# Patient Record
Sex: FEMALE | Race: WHITE | NOT HISPANIC OR LATINO | Employment: STUDENT | URBAN - METROPOLITAN AREA
[De-identification: names, ages, dates, MRNs, and addresses within clinical notes are randomized per-mention and may not be internally consistent; named-entity substitution may affect disease eponyms.]

---

## 2017-02-10 ENCOUNTER — TELEPHONE (OUTPATIENT)
Dept: PEDIATRIC GASTROENTEROLOGY | Facility: CLINIC | Age: 22
End: 2017-02-10

## 2017-02-10 NOTE — TELEPHONE ENCOUNTER
----- Message from Jt Peña sent at 2/10/2017 10:45 AM CST -----  Contact: Pt [phone]  Pt calling to see if the bone density results can be faxed to another Dr office. The office (fax# is [phone]). Please call to advise --------- Pt [phone]

## 2017-02-14 ENCOUNTER — HOSPITAL ENCOUNTER (OUTPATIENT)
Dept: INFUSION THERAPY | Facility: HOSPITAL | Age: 22
Discharge: HOME | End: 2017-02-14
Attending: PEDIATRICS
Payer: COMMERCIAL

## 2017-02-14 VITALS
BODY MASS INDEX: 20.24 KG/M2 | WEIGHT: 110 LBS | HEART RATE: 59 BPM | RESPIRATION RATE: 20 BRPM | SYSTOLIC BLOOD PRESSURE: 116 MMHG | HEIGHT: 62 IN | TEMPERATURE: 99 F | DIASTOLIC BLOOD PRESSURE: 69 MMHG

## 2017-02-14 DIAGNOSIS — R70.0 ELEVATED SEDIMENTATION RATE: ICD-10-CM

## 2017-02-14 DIAGNOSIS — K51.90 UC (ULCERATIVE COLITIS): ICD-10-CM

## 2017-02-14 LAB
ALBUMIN SERPL BCP-MCNC: 3.8 G/DL
ALP SERPL-CCNC: 61 U/L
ALT SERPL W/O P-5'-P-CCNC: 20 U/L
ANION GAP SERPL CALC-SCNC: 7 MMOL/L
AST SERPL-CCNC: 20 U/L
BASOPHILS # BLD AUTO: 0.03 K/UL
BASOPHILS NFR BLD: 0.4 %
BILIRUB SERPL-MCNC: 0.4 MG/DL
BUN SERPL-MCNC: 7 MG/DL
CALCIUM SERPL-MCNC: 9.4 MG/DL
CHLORIDE SERPL-SCNC: 109 MMOL/L
CO2 SERPL-SCNC: 24 MMOL/L
CREAT SERPL-MCNC: 0.8 MG/DL
CRP SERPL-MCNC: 0.58 MG/L
DIFFERENTIAL METHOD: ABNORMAL
EOSINOPHIL # BLD AUTO: 0.2 K/UL
EOSINOPHIL NFR BLD: 2.5 %
ERYTHROCYTE [DISTWIDTH] IN BLOOD BY AUTOMATED COUNT: 13.6 %
ERYTHROCYTE [SEDIMENTATION RATE] IN BLOOD BY WESTERGREN METHOD: 9 MM/HR
EST. GFR  (AFRICAN AMERICAN): >60 ML/MIN/1.73 M^2
EST. GFR  (NON AFRICAN AMERICAN): >60 ML/MIN/1.73 M^2
GGT SERPL-CCNC: 28 U/L
GLUCOSE SERPL-MCNC: 66 MG/DL
HCT VFR BLD AUTO: 38.7 %
HGB BLD-MCNC: 12.7 G/DL
LYMPHOCYTES # BLD AUTO: 2.3 K/UL
LYMPHOCYTES NFR BLD: 33.3 %
MCH RBC QN AUTO: 30.1 PG
MCHC RBC AUTO-ENTMCNC: 32.8 %
MCV RBC AUTO: 92 FL
MONOCYTES # BLD AUTO: 0.6 K/UL
MONOCYTES NFR BLD: 7.9 %
NEUTROPHILS # BLD AUTO: 3.9 K/UL
NEUTROPHILS NFR BLD: 55.8 %
PLATELET # BLD AUTO: 385 K/UL
PMV BLD AUTO: 10.6 FL
POTASSIUM SERPL-SCNC: 3.9 MMOL/L
PROT SERPL-MCNC: 7.9 G/DL
RBC # BLD AUTO: 4.22 M/UL
SODIUM SERPL-SCNC: 140 MMOL/L
WBC # BLD AUTO: 6.93 K/UL

## 2017-02-14 PROCEDURE — 82977 ASSAY OF GGT: CPT

## 2017-02-14 PROCEDURE — 25000003 PHARM REV CODE 250: Mod: PO | Performed by: PEDIATRICS

## 2017-02-14 PROCEDURE — 85651 RBC SED RATE NONAUTOMATED: CPT

## 2017-02-14 PROCEDURE — 63600175 PHARM REV CODE 636 W HCPCS: Mod: PO | Performed by: PEDIATRICS

## 2017-02-14 PROCEDURE — 96415 CHEMO IV INFUSION ADDL HR: CPT | Mod: PO

## 2017-02-14 PROCEDURE — 80053 COMPREHEN METABOLIC PANEL: CPT

## 2017-02-14 PROCEDURE — 86141 C-REACTIVE PROTEIN HS: CPT

## 2017-02-14 PROCEDURE — 85025 COMPLETE CBC W/AUTO DIFF WBC: CPT

## 2017-02-14 PROCEDURE — 96413 CHEMO IV INFUSION 1 HR: CPT | Mod: PO

## 2017-02-14 RX ORDER — SODIUM CHLORIDE 9 MG/ML
INJECTION, SOLUTION INTRAVENOUS ONCE
Status: CANCELLED | OUTPATIENT
Start: 2017-02-14 | End: 2017-02-14

## 2017-02-14 RX ORDER — DIPHENHYDRAMINE HCL 25 MG
25 CAPSULE ORAL ONCE
Status: COMPLETED | OUTPATIENT
Start: 2017-02-14 | End: 2017-02-14

## 2017-02-14 RX ORDER — SODIUM CHLORIDE 9 MG/ML
INJECTION, SOLUTION INTRAVENOUS ONCE
Status: COMPLETED | OUTPATIENT
Start: 2017-02-14 | End: 2017-02-14

## 2017-02-14 RX ORDER — SODIUM CHLORIDE 0.9 % (FLUSH) 0.9 %
5 SYRINGE (ML) INJECTION
Status: CANCELLED | OUTPATIENT
Start: 2017-02-14

## 2017-02-14 RX ORDER — DIPHENHYDRAMINE HCL 25 MG
25 CAPSULE ORAL ONCE
Status: CANCELLED | OUTPATIENT
Start: 2017-02-14 | End: 2017-02-14

## 2017-02-14 RX ORDER — ACETAMINOPHEN 325 MG/1
650 TABLET ORAL
Status: CANCELLED | OUTPATIENT
Start: 2017-02-14 | End: 2017-02-14

## 2017-02-14 RX ORDER — ACETAMINOPHEN 325 MG/1
650 TABLET ORAL
Status: DISCONTINUED | OUTPATIENT
Start: 2017-02-14 | End: 2017-02-15 | Stop reason: HOSPADM

## 2017-02-14 RX ORDER — SODIUM CHLORIDE 0.9 % (FLUSH) 0.9 %
5 SYRINGE (ML) INJECTION
Status: DISCONTINUED | OUTPATIENT
Start: 2017-02-14 | End: 2017-02-15 | Stop reason: HOSPADM

## 2017-02-14 RX ADMIN — SODIUM CHLORIDE, PRESERVATIVE FREE 5 ML: 5 INJECTION INTRAVENOUS at 09:02

## 2017-02-14 RX ADMIN — SODIUM CHLORIDE, PRESERVATIVE FREE 5 ML: 5 INJECTION INTRAVENOUS at 10:02

## 2017-02-14 RX ADMIN — SODIUM CHLORIDE: 9 INJECTION, SOLUTION INTRAVENOUS at 12:02

## 2017-02-14 RX ADMIN — DIPHENHYDRAMINE HYDROCHLORIDE 25 MG: 25 CAPSULE ORAL at 09:02

## 2017-02-14 RX ADMIN — INFLIXIMAB 250 MG: 100 INJECTION, POWDER, LYOPHILIZED, FOR SOLUTION INTRAVENOUS at 10:02

## 2017-02-14 NOTE — PLAN OF CARE
Problem: Patient Care Overview (Adult)  Goal: Plan of Care Review  1. Pt likes IV in right arm.  2. Pt likes to listen to music + read.   Outcome: Ongoing (interventions implemented as appropriate)  Pt states that she is doing well, no problems noted at this time. Pt tolerated remicade today in clinic without any adverse effects.

## 2017-02-14 NOTE — PROGRESS NOTES
1250 - Remicade complete at this time, patient tolerated well. No signs or symptoms of adverse effects noted. VSS, afebrile. PIV d/c'd, catheter tip intact, dry gauze and coban applied over site. Instructed patient to call for any needs or concerns. Patient verbalized complete understanding. Follow up appt given to patient and mother.

## 2017-04-25 ENCOUNTER — OFFICE VISIT (OUTPATIENT)
Dept: PEDIATRIC GASTROENTEROLOGY | Facility: CLINIC | Age: 22
End: 2017-04-25
Payer: COMMERCIAL

## 2017-04-25 ENCOUNTER — HOSPITAL ENCOUNTER (OUTPATIENT)
Dept: INFUSION THERAPY | Facility: HOSPITAL | Age: 22
Discharge: HOME | End: 2017-04-25
Attending: PEDIATRICS
Payer: COMMERCIAL

## 2017-04-25 ENCOUNTER — TELEPHONE (OUTPATIENT)
Dept: PEDIATRIC GASTROENTEROLOGY | Facility: CLINIC | Age: 22
End: 2017-04-25

## 2017-04-25 VITALS
HEART RATE: 66 BPM | SYSTOLIC BLOOD PRESSURE: 105 MMHG | DIASTOLIC BLOOD PRESSURE: 76 MMHG | TEMPERATURE: 98 F | WEIGHT: 108.38 LBS | RESPIRATION RATE: 20 BRPM | HEIGHT: 62 IN | BODY MASS INDEX: 19.94 KG/M2

## 2017-04-25 DIAGNOSIS — K51.00 ULCERATIVE PANCOLITIS WITHOUT COMPLICATION: Primary | ICD-10-CM

## 2017-04-25 DIAGNOSIS — R70.0 ELEVATED SEDIMENTATION RATE: ICD-10-CM

## 2017-04-25 DIAGNOSIS — D84.9 IMMUNOSUPPRESSION: ICD-10-CM

## 2017-04-25 DIAGNOSIS — K51.90 UC (ULCERATIVE COLITIS): ICD-10-CM

## 2017-04-25 DIAGNOSIS — M85.9 LOW BONE DENSITY FOR AGE: ICD-10-CM

## 2017-04-25 LAB
ALBUMIN SERPL BCP-MCNC: 3.6 G/DL
ALP SERPL-CCNC: 67 U/L
ALT SERPL W/O P-5'-P-CCNC: 8 U/L
ANION GAP SERPL CALC-SCNC: 10 MMOL/L
AST SERPL-CCNC: 16 U/L
BASOPHILS # BLD AUTO: 0.05 K/UL
BASOPHILS NFR BLD: 0.6 %
BILIRUB SERPL-MCNC: 0.4 MG/DL
BUN SERPL-MCNC: 6 MG/DL
CALCIUM SERPL-MCNC: 9.9 MG/DL
CHLORIDE SERPL-SCNC: 106 MMOL/L
CO2 SERPL-SCNC: 21 MMOL/L
CREAT SERPL-MCNC: 0.8 MG/DL
CRP SERPL-MCNC: 3.46 MG/L
DIFFERENTIAL METHOD: ABNORMAL
EOSINOPHIL # BLD AUTO: 0.2 K/UL
EOSINOPHIL NFR BLD: 2 %
ERYTHROCYTE [DISTWIDTH] IN BLOOD BY AUTOMATED COUNT: 13.1 %
ERYTHROCYTE [SEDIMENTATION RATE] IN BLOOD BY WESTERGREN METHOD: 25 MM/HR
EST. GFR  (AFRICAN AMERICAN): >60 ML/MIN/1.73 M^2
EST. GFR  (NON AFRICAN AMERICAN): >60 ML/MIN/1.73 M^2
GGT SERPL-CCNC: 23 U/L
GLUCOSE SERPL-MCNC: 74 MG/DL
HCT VFR BLD AUTO: 38.4 %
HGB BLD-MCNC: 13 G/DL
LYMPHOCYTES # BLD AUTO: 2.2 K/UL
LYMPHOCYTES NFR BLD: 25.3 %
MCH RBC QN AUTO: 29 PG
MCHC RBC AUTO-ENTMCNC: 33.9 %
MCV RBC AUTO: 86 FL
MONOCYTES # BLD AUTO: 0.5 K/UL
MONOCYTES NFR BLD: 5.7 %
NEUTROPHILS # BLD AUTO: 5.7 K/UL
NEUTROPHILS NFR BLD: 66.4 %
PLATELET # BLD AUTO: 405 K/UL
PMV BLD AUTO: 10.6 FL
POTASSIUM SERPL-SCNC: 3.7 MMOL/L
PROT SERPL-MCNC: 8.3 G/DL
RBC # BLD AUTO: 4.49 M/UL
SODIUM SERPL-SCNC: 137 MMOL/L
WBC # BLD AUTO: 8.61 K/UL

## 2017-04-25 PROCEDURE — 25000003 PHARM REV CODE 250: Mod: PO | Performed by: PEDIATRICS

## 2017-04-25 PROCEDURE — 82977 ASSAY OF GGT: CPT

## 2017-04-25 PROCEDURE — 85651 RBC SED RATE NONAUTOMATED: CPT

## 2017-04-25 PROCEDURE — 96415 CHEMO IV INFUSION ADDL HR: CPT | Mod: PO

## 2017-04-25 PROCEDURE — 99214 OFFICE O/P EST MOD 30 MIN: CPT | Mod: S$GLB,,, | Performed by: PEDIATRICS

## 2017-04-25 PROCEDURE — 85025 COMPLETE CBC W/AUTO DIFF WBC: CPT

## 2017-04-25 PROCEDURE — 80053 COMPREHEN METABOLIC PANEL: CPT

## 2017-04-25 PROCEDURE — 63600175 PHARM REV CODE 636 W HCPCS: Mod: JW,PO | Performed by: PEDIATRICS

## 2017-04-25 PROCEDURE — 99999 PR PBB SHADOW E&M-EST. PATIENT-LVL II: CPT | Mod: PBBFAC,,, | Performed by: PEDIATRICS

## 2017-04-25 PROCEDURE — 86141 C-REACTIVE PROTEIN HS: CPT

## 2017-04-25 PROCEDURE — 96413 CHEMO IV INFUSION 1 HR: CPT | Mod: PO

## 2017-04-25 RX ORDER — SODIUM CHLORIDE 9 MG/ML
INJECTION, SOLUTION INTRAVENOUS ONCE
Status: COMPLETED | OUTPATIENT
Start: 2017-04-25 | End: 2017-04-25

## 2017-04-25 RX ORDER — DIPHENHYDRAMINE HCL 25 MG
25 CAPSULE ORAL ONCE
Status: CANCELLED | OUTPATIENT
Start: 2017-04-25 | End: 2017-04-25

## 2017-04-25 RX ORDER — DIPHENHYDRAMINE HCL 25 MG
25 CAPSULE ORAL ONCE
Status: COMPLETED | OUTPATIENT
Start: 2017-04-25 | End: 2017-04-25

## 2017-04-25 RX ORDER — SODIUM CHLORIDE 0.9 % (FLUSH) 0.9 %
5 SYRINGE (ML) INJECTION
Status: DISCONTINUED | OUTPATIENT
Start: 2017-04-25 | End: 2017-04-26 | Stop reason: HOSPADM

## 2017-04-25 RX ORDER — ACETAMINOPHEN 325 MG/1
650 TABLET ORAL
Status: CANCELLED | OUTPATIENT
Start: 2017-04-25 | End: 2017-04-25

## 2017-04-25 RX ORDER — SODIUM CHLORIDE 9 MG/ML
INJECTION, SOLUTION INTRAVENOUS ONCE
Status: CANCELLED | OUTPATIENT
Start: 2017-04-25 | End: 2017-04-25

## 2017-04-25 RX ORDER — ACETAMINOPHEN 325 MG/1
650 TABLET ORAL
Status: DISCONTINUED | OUTPATIENT
Start: 2017-04-25 | End: 2017-04-26 | Stop reason: HOSPADM

## 2017-04-25 RX ORDER — SODIUM CHLORIDE 0.9 % (FLUSH) 0.9 %
5 SYRINGE (ML) INJECTION
Status: CANCELLED | OUTPATIENT
Start: 2017-04-25

## 2017-04-25 RX ADMIN — SODIUM CHLORIDE: 9 INJECTION, SOLUTION INTRAVENOUS at 11:04

## 2017-04-25 RX ADMIN — INFLIXIMAB 250 MG: 100 INJECTION, POWDER, LYOPHILIZED, FOR SOLUTION INTRAVENOUS at 09:04

## 2017-04-25 RX ADMIN — DIPHENHYDRAMINE HYDROCHLORIDE 25 MG: 25 CAPSULE ORAL at 09:04

## 2017-04-25 RX ADMIN — SODIUM CHLORIDE, PRESERVATIVE FREE 5 ML: 5 INJECTION INTRAVENOUS at 09:04

## 2017-04-25 NOTE — LETTER
April 25, 2017        Zainab Eagle MD  [address]  [place] [address]             [facility]  [address]  [place] [address]  Phone: [phone]   Patient: Fatoumata Soriano   MR Number: [id number]   YOB: 1995   Date of Visit: 4/25/2017       Dear Dr. Eagle:    Thank you for referring Fatoumata Soriano to me for evaluation. Attached you will find relevant portions of my assessment and plan of care.    If you have questions, please do not hesitate to call me. I look forward to following Fatoumata Soriano along with you.    Sincerely,      Johnnie Hicks MD            CC  No Recipients    Enclosure

## 2017-04-25 NOTE — PROGRESS NOTES
Subjective:       Patient ID: Fatoumata Soriano is a 21 y.o. female.    Chief Complaint: No chief complaint on file.    HPI  Review of Systems   Constitutional: Negative for activity change, appetite change, fatigue, fever and unexpected weight change.   HENT: Negative for congestion, hearing loss, mouth sores, rhinorrhea, sneezing, sore throat and trouble swallowing.    Eyes: Negative for photophobia and visual disturbance.   Respiratory: Negative for apnea, cough, choking, chest tightness, shortness of breath, wheezing and stridor.    Cardiovascular: Negative for chest pain.   Gastrointestinal: Negative for blood in stool, constipation and diarrhea.   Endocrine: Negative for cold intolerance and heat intolerance.   Genitourinary: Negative for decreased urine volume, dysuria and menstrual problem.   Musculoskeletal: Negative for arthralgias, back pain, joint swelling, myalgias, neck pain and neck stiffness.   Skin: Negative for pallor and rash.   Allergic/Immunologic: Negative for food allergies.   Neurological: Negative for seizures, weakness and headaches.   Hematological: Negative for adenopathy. Does not bruise/bleed easily.   Psychiatric/Behavioral: Negative for agitation and sleep disturbance. The patient is not nervous/anxious and is not hyperactive.        Objective:      Physical Exam    Assessment:       1. Ulcerative pancolitis without complication    2. Low bone density for age    3. Immunosuppression        Plan:       CHIEF COMPLAINT: Patient is here for follow up of ulcerative colitis.    HISTORY OF PRESENT ILLNESS: Patient follows up today for ongoing care of above symptoms and for her Remicade infusion.  Patient receives every 10 week infusions as that was established prior to seeing me a few years ago.  Her last Remicade level was 4.9 last year with no antibodies.  Her labs have been normal recently.  Her last DEXA scan showed a Z score of -2.8 in her lower spine in 2015.  Her last colonoscopy was  performed in 2014.  She was diagnosed at age 9.  Currently she reports no pain or diarrhea.  Her bowel movements are once a day and formed.  There is no nighttime awakening.  There've been no fevers.  Her energy level is good.  She has had no trouble with her infusions.  There are no rashes joint complaints or mouth ulcers.  Her menstrual cycles are normal.  Overall she states she is feeling very well.  There is no fatigue.    STUDIES REVIEWED: As above in history of present illness    MEDICATIONS/ALLERGIES: The patient's MedCard has been reviewed and/or reconciled.    PMH, SH, FH, all reviewed and no changes except as noted.    PHYSICAL EXAMINATION:     General: Alert, WN, WH, NAD  Chest: Clear to auscultation bilaterally.No increased work of breathing   Heart: Regular, rate and rhythm without murmur  Abdomen: Soft, non tender, non distended, positive bowel sounds, no hepatosplenomegaly, no stool masses, no rebound or guarding.  Extremities: Symmetric, well perfused and no edema.      IMPRESSION/PLAN: Patient follows up today for ongoing care of above symptoms and for her Remicade infusion.  Clinically she is doing well.  There was maybe some low-level inflammation at previous colonoscopy in 2014.  I discussed with the patient and her mom that we need to repeat colonoscopy soon as it is coming up on 3 years.  She is past 10 years of diagnosis of her disease which starts the increase in her colon cancer risk.  Certainly dysplasia would be a indication for colectomy.  Her weight is down just a little bit but tends to fluctuate a few pounds here there and not a concern.  Her labs were normal last time.  Her Remicade level has been on the lower side that she does not wish to increase her dosage or decreasing her interval.  I will check a level at next infusion.  Reassess.  Certainly lower levels and less frequent dosing increases the risk of antibody formation and loss of response.  Patient needs health maintenance  items as listed below including Prevnar 13 if not already received followed 8 weeks later by Pneumovax.  I will set her up for repeat DEXA scan given her low bone mineral density.  She will follow-up in 10 weeks for her next infusion.  I will schedule her for colonoscopy soon to reassess. I discussed the risk benefits and alternatives of the procedure including sedation by anesthesia and risk of perforating or bruising the organs of the GI tract with the caretaker who verbalized understanding of the plan and risk associated and agreed to proceed. Consent will be obtained at time of endoscopy.    Patient Instructions   Check Level with next infusion  Monitor weight  Yearly eye exams  Yearly flu shots  Prevnar 13 if not already received, followed 8 weeks later with pneumovax  Yearly TB testing  Dexa scan  Repeat Colonoscopy this summer  Follow up 10 weeks      This was discussed at length with parents who expressed understanding and agreement. Questions were answered.  This note has been dictated using voice recognition software.

## 2017-04-25 NOTE — PLAN OF CARE
Problem: Patient Care Overview (Adult)  Goal: Plan of Care Review  1. Pt likes IV in right arm.  2. Pt likes to listen to music + read.   Outcome: Ongoing (interventions implemented as appropriate)  Pt stated that she has been doing well. No problems reported.  Pt tolerated remicade without complications today.

## 2017-04-25 NOTE — PROGRESS NOTES
Remicade infusion complete @ this time.  Pt tolerated infusion without difficulty.  No S+S of adverse reactions noted.  Vital signs stable, afebrile throughout infusion.  IV to right AC d/c'd.  Catheter intact.  Pressure dressing with gauze + coban applied to site.  Pt tolerated procedure well.  Mom instructed to return to clinic in 10 weeks, + to call clinic for any problems or concerns.  Mom repeated back instructions, + verbalized complete understanding.

## 2017-04-25 NOTE — PATIENT INSTRUCTIONS
Check Level with next infusion  Monitor weight  Yearly eye exams  Yearly flu shots  Prevnar 13 if not already received, followed 8 weeks later with pneumovax  Yearly TB testing  Dexa scan  Repeat Colonoscopy this summer  Follow up 10 weeks

## 2017-04-25 NOTE — MR AVS SNAPSHOT
[facility]  [address]  [facility] [address]  Phone: [phone]                  Fatoumata Soriano   2017 8:15 AM   Office Visit    Description:  Female : 1995   Provider:  Johnnie Hicks MD   Department:  [facility]           Diagnoses this Visit        Comments    Ulcerative pancolitis without complication    -  Primary     Low bone density for age         Immunosuppression                To Do List           Future Appointments        Provider Department Dept Phone    2017 9:00 AM PEDS INFUSION CHAIR 1 [facility] [phone]      Goals (5 Years of Data)     None      Follow-Up and Disposition     Return in about 10 weeks (around 2017).      [facility]     [facility] Nurse Care Line -  Assistance  Unless otherwise directed by your provider, please contact [facility], our nurse care line that is available for  assistance.     Registered nurses in the [facility] provide: appointment scheduling, clinical advisement, health education, and other advisory services.  Call: [phone] (toll free)               Medications           Message regarding Medications     Verify the changes and/or additions to your medication regime listed below are the same as discussed with your clinician today.  If any of these changes or additions are incorrect, please notify your healthcare provider.             Verify that the below list of medications is an accurate representation of the medications you are currently taking.  If none reported, the list may be blank. If incorrect, please contact your healthcare provider. Carry this list with you in case of emergency.           Current Medications     calcium carbonate (OS-JOSE RAUL) 600 mg (1,500 mg) Tab Take 1,200 mg by mouth once daily.    ergocalciferol (VITAMIN D2) 50,000 unit Cap Take 800 Units by mouth once daily.    INFLIXIMAB IV Inject into the vein.  Gets every 10 weeks for ulcerative colitis    KARIVA, 28, 0.15-0.02 mgx21 /0.01 mg x 5 per tablet Take 1 tablet by mouth once daily.           Clinical Reference Information           Allergies as of 4/25/2017     No Known Allergies      Immunizations Administered on Date of Encounter - 4/25/2017     None      Orders Placed During Today's Visit      Normal Orders This Visit    Case request GI: COLONOSCOPY     Future Labs/Procedures Expected by Expires    Anser IFX, Subsequent  4/25/2017 6/24/2018    DXA Bone Density Spine And Hip  4/25/2017 4/25/2018      Instructions    Check Level with next infusion  Monitor weight  Yearly eye exams  Yearly flu shots  Prevnar 13 if not already received, followed 8 weeks later with pneumovax  Yearly TB testing  Dexa scan  Repeat Colonoscopy this summer  Follow up 10 weeks       Language Assistance Services     ATTENTION: Language assistance services are available, free of charge. Please call [phone].      ATENCIÓN: Si habla español, tiene a schultz disposición servicios gratuitos de asistencia lingüística. Llame al [phone].     ZEESHAN Ý: N?u b?n nói Ti?ng Vi?t, có các d?ch v? h? tr? ngôn ng? mi?n phí dành cho b?n. G?i s? [phone].         [facility] complies with applicable Federal civil rights laws and does not discriminate on the basis of race, color, national origin, age, disability, or sex.

## 2017-04-25 NOTE — TELEPHONE ENCOUNTER
----- Message from Johnnie Hicks MD sent at 4/25/2017 12:08 PM CDT -----  Needs to be scheduled for Dexa scan and a Colonoscopy

## 2017-04-26 ENCOUNTER — TELEPHONE (OUTPATIENT)
Dept: PEDIATRIC GASTROENTEROLOGY | Facility: CLINIC | Age: 22
End: 2017-04-26

## 2017-05-18 ENCOUNTER — TELEPHONE (OUTPATIENT)
Dept: PEDIATRIC GASTROENTEROLOGY | Facility: CLINIC | Age: 22
End: 2017-05-18

## 2017-05-18 NOTE — TELEPHONE ENCOUNTER
----- Message from Phyllis Hickey sent at 5/18/2017 11:53 AM CDT -----  Contact: [phone] patient  Need a CPT code for Dexascan

## 2017-07-06 ENCOUNTER — HOSPITAL ENCOUNTER (OUTPATIENT)
Dept: INFUSION THERAPY | Facility: HOSPITAL | Age: 22
Discharge: HOME | End: 2017-07-06
Attending: PEDIATRICS
Payer: COMMERCIAL

## 2017-07-06 ENCOUNTER — PATIENT MESSAGE (OUTPATIENT)
Dept: PEDIATRIC GASTROENTEROLOGY | Facility: CLINIC | Age: 22
End: 2017-07-06

## 2017-07-06 ENCOUNTER — OFFICE VISIT (OUTPATIENT)
Dept: PEDIATRIC GASTROENTEROLOGY | Facility: CLINIC | Age: 22
End: 2017-07-06
Payer: COMMERCIAL

## 2017-07-06 VITALS
SYSTOLIC BLOOD PRESSURE: 102 MMHG | RESPIRATION RATE: 20 BRPM | TEMPERATURE: 98 F | HEART RATE: 70 BPM | DIASTOLIC BLOOD PRESSURE: 74 MMHG | WEIGHT: 109.69 LBS | HEIGHT: 62 IN | BODY MASS INDEX: 20.18 KG/M2

## 2017-07-06 VITALS
HEIGHT: 62 IN | DIASTOLIC BLOOD PRESSURE: 73 MMHG | WEIGHT: 109.69 LBS | RESPIRATION RATE: 20 BRPM | SYSTOLIC BLOOD PRESSURE: 112 MMHG | TEMPERATURE: 97 F | HEART RATE: 74 BPM | BODY MASS INDEX: 20.18 KG/M2

## 2017-07-06 DIAGNOSIS — M85.9 LOW BONE DENSITY FOR AGE: ICD-10-CM

## 2017-07-06 DIAGNOSIS — R70.0 ELEVATED SEDIMENTATION RATE: ICD-10-CM

## 2017-07-06 DIAGNOSIS — D84.9 IMMUNOSUPPRESSION: ICD-10-CM

## 2017-07-06 DIAGNOSIS — K51.90 UC (ULCERATIVE COLITIS): ICD-10-CM

## 2017-07-06 DIAGNOSIS — K51.00 ULCERATIVE PANCOLITIS WITHOUT COMPLICATION: ICD-10-CM

## 2017-07-06 LAB
ALBUMIN SERPL BCP-MCNC: 3.8 G/DL
ALP SERPL-CCNC: 86 U/L
ALT SERPL W/O P-5'-P-CCNC: 21 U/L
ANION GAP SERPL CALC-SCNC: 8 MMOL/L
AST SERPL-CCNC: 25 U/L
BASOPHILS # BLD AUTO: 0.03 K/UL
BASOPHILS NFR BLD: 0.4 %
BILIRUB SERPL-MCNC: 0.3 MG/DL
BUN SERPL-MCNC: 13 MG/DL
CALCIUM SERPL-MCNC: 9.4 MG/DL
CHLORIDE SERPL-SCNC: 104 MMOL/L
CO2 SERPL-SCNC: 24 MMOL/L
CREAT SERPL-MCNC: 0.8 MG/DL
CRP SERPL-MCNC: 0.96 MG/L
DIFFERENTIAL METHOD: ABNORMAL
EOSINOPHIL # BLD AUTO: 0.1 K/UL
EOSINOPHIL NFR BLD: 1.7 %
ERYTHROCYTE [DISTWIDTH] IN BLOOD BY AUTOMATED COUNT: 13.6 %
ERYTHROCYTE [SEDIMENTATION RATE] IN BLOOD BY WESTERGREN METHOD: 19 MM/HR
EST. GFR  (AFRICAN AMERICAN): >60 ML/MIN/1.73 M^2
EST. GFR  (NON AFRICAN AMERICAN): >60 ML/MIN/1.73 M^2
GGT SERPL-CCNC: 27 U/L
GLUCOSE SERPL-MCNC: 82 MG/DL
HCT VFR BLD AUTO: 38.3 %
HGB BLD-MCNC: 12.5 G/DL
LYMPHOCYTES # BLD AUTO: 3 K/UL
LYMPHOCYTES NFR BLD: 42.7 %
MCH RBC QN AUTO: 28.7 PG
MCHC RBC AUTO-ENTMCNC: 32.6 %
MCV RBC AUTO: 88 FL
MONOCYTES # BLD AUTO: 0.5 K/UL
MONOCYTES NFR BLD: 7.4 %
NEUTROPHILS # BLD AUTO: 3.4 K/UL
NEUTROPHILS NFR BLD: 47.7 %
PLATELET # BLD AUTO: 400 K/UL
PMV BLD AUTO: 10.3 FL
POTASSIUM SERPL-SCNC: 4.1 MMOL/L
PROT SERPL-MCNC: 7.9 G/DL
RBC # BLD AUTO: 4.35 M/UL
SODIUM SERPL-SCNC: 136 MMOL/L
WBC # BLD AUTO: 7.03 K/UL

## 2017-07-06 PROCEDURE — 63600175 PHARM REV CODE 636 W HCPCS: Mod: PO | Performed by: PEDIATRICS

## 2017-07-06 PROCEDURE — 96413 CHEMO IV INFUSION 1 HR: CPT | Mod: PO

## 2017-07-06 PROCEDURE — 86141 C-REACTIVE PROTEIN HS: CPT

## 2017-07-06 PROCEDURE — 82977 ASSAY OF GGT: CPT

## 2017-07-06 PROCEDURE — 84999 UNLISTED CHEMISTRY PROCEDURE: CPT

## 2017-07-06 PROCEDURE — 96415 CHEMO IV INFUSION ADDL HR: CPT | Mod: PO

## 2017-07-06 PROCEDURE — 80053 COMPREHEN METABOLIC PANEL: CPT

## 2017-07-06 PROCEDURE — 85651 RBC SED RATE NONAUTOMATED: CPT

## 2017-07-06 PROCEDURE — 25000003 PHARM REV CODE 250: Mod: PO | Performed by: PEDIATRICS

## 2017-07-06 PROCEDURE — 99999 PR PBB SHADOW E&M-EST. PATIENT-LVL V: CPT | Mod: PBBFAC,,, | Performed by: PEDIATRICS

## 2017-07-06 PROCEDURE — 99214 OFFICE O/P EST MOD 30 MIN: CPT | Mod: S$GLB,,, | Performed by: PEDIATRICS

## 2017-07-06 PROCEDURE — 85025 COMPLETE CBC W/AUTO DIFF WBC: CPT

## 2017-07-06 RX ORDER — DIPHENHYDRAMINE HCL 25 MG
25 CAPSULE ORAL ONCE
Status: CANCELLED | OUTPATIENT
Start: 2017-07-06 | End: 2017-07-06

## 2017-07-06 RX ORDER — SODIUM CHLORIDE 9 MG/ML
INJECTION, SOLUTION INTRAVENOUS ONCE
Status: CANCELLED | OUTPATIENT
Start: 2017-07-06 | End: 2017-07-06

## 2017-07-06 RX ORDER — ACETAMINOPHEN 325 MG/1
650 TABLET ORAL
Status: CANCELLED | OUTPATIENT
Start: 2017-07-06 | End: 2017-07-06

## 2017-07-06 RX ORDER — VENLAFAXINE HYDROCHLORIDE 75 MG/1
CAPSULE, EXTENDED RELEASE ORAL
Refills: 1 | COMMUNITY
Start: 2017-06-07 | End: 2019-03-29 | Stop reason: DRUGHIGH

## 2017-07-06 RX ORDER — ACETAMINOPHEN 325 MG/1
650 TABLET ORAL
Status: DISCONTINUED | OUTPATIENT
Start: 2017-07-06 | End: 2017-07-07 | Stop reason: HOSPADM

## 2017-07-06 RX ORDER — DIPHENHYDRAMINE HCL 25 MG
25 CAPSULE ORAL ONCE
Status: COMPLETED | OUTPATIENT
Start: 2017-07-06 | End: 2017-07-06

## 2017-07-06 RX ORDER — SODIUM CHLORIDE 0.9 % (FLUSH) 0.9 %
5 SYRINGE (ML) INJECTION
Status: DISCONTINUED | OUTPATIENT
Start: 2017-07-06 | End: 2017-07-07 | Stop reason: HOSPADM

## 2017-07-06 RX ORDER — SODIUM CHLORIDE 9 MG/ML
INJECTION, SOLUTION INTRAVENOUS ONCE
Status: COMPLETED | OUTPATIENT
Start: 2017-07-06 | End: 2017-07-06

## 2017-07-06 RX ORDER — SODIUM CHLORIDE 0.9 % (FLUSH) 0.9 %
5 SYRINGE (ML) INJECTION
Status: CANCELLED | OUTPATIENT
Start: 2017-07-06

## 2017-07-06 RX ADMIN — DIPHENHYDRAMINE HYDROCHLORIDE 25 MG: 25 CAPSULE ORAL at 09:07

## 2017-07-06 RX ADMIN — INFLIXIMAB 250 MG: 100 INJECTION, POWDER, LYOPHILIZED, FOR SOLUTION INTRAVENOUS at 09:07

## 2017-07-06 RX ADMIN — SODIUM CHLORIDE: 9 INJECTION, SOLUTION INTRAVENOUS at 11:07

## 2017-07-06 RX ADMIN — SODIUM CHLORIDE, PRESERVATIVE FREE 5 ML: 5 INJECTION INTRAVENOUS at 09:07

## 2017-07-06 NOTE — PLAN OF CARE
Problem: Patient Care Overview (Adult)  Goal: Plan of Care Review  1.  Pt likes IV in right arm.  2.  Pt likes to listen to music + read.   Outcome: Ongoing (interventions implemented as appropriate)  Pt stated that she has been doing well.  She has been having an uneventful summer, + keeping busy with work.  No GI problems reported today.  Pt tolerated remicade infusion without difficulty today.

## 2017-07-06 NOTE — LETTER
July 6, 2017        Zainab Eagle MD  [address]  [place] [address]             [facility]  [address]  [place] [address]  Phone: [phone]   Patient: Fatoumata Soriano   MR Number: [id number]   YOB: 1995   Date of Visit: 7/6/2017       Dear Dr. Eagle:    Thank you for referring Fatoumata Soriano to me for evaluation. Attached you will find relevant portions of my assessment and plan of care.    If you have questions, please do not hesitate to call me. I look forward to following Fatoumata Soriano along with you.    Sincerely,      Johnnie Hicks MD            CC  No Recipients    Enclosure

## 2017-07-06 NOTE — PROGRESS NOTES
Remicade infusion complete @ this time.  Pt tolerated infusion without difficulty.  No S+S of adverse reactions noted.  Vital signs stable, afebrile throughout infusion.  IV to left AC d/c'd.  Catheter intact.  Pressure dressing with gauze + coban applied to site.  Pt tolerated procedure well.  Pt instructed to return to clinic in 10 weeks, + to call clinic for any problems or concerns.  Pt repeated back instructions, + verbalized complete understanding.

## 2017-07-07 ENCOUNTER — TELEPHONE (OUTPATIENT)
Dept: PEDIATRIC GASTROENTEROLOGY | Facility: CLINIC | Age: 22
End: 2017-07-07

## 2017-07-07 ENCOUNTER — PATIENT MESSAGE (OUTPATIENT)
Dept: PEDIATRIC GASTROENTEROLOGY | Facility: CLINIC | Age: 22
End: 2017-07-07

## 2017-07-07 NOTE — PATIENT INSTRUCTIONS
Remicade level today  Recommend Pneumovax  Yearly Flu shots  Yearly Eye exams  Yearly TB skin testing  Repeat Dexa scan  Colonoscopy to be scheduled  Follow up 10 weeks

## 2017-07-07 NOTE — TELEPHONE ENCOUNTER
----- Message from Johnnie Hicks MD sent at 7/6/2017 10:39 PM CDT -----  Needs to be scheduled for colonoscopy and a dexa scan. BM

## 2017-07-07 NOTE — TELEPHONE ENCOUNTER
Sent mom a message through [employer] to see when and at which location she would like for me to sched a dexa scan.

## 2017-08-17 LAB — ANSER IFX,SUBSEQUENT: NORMAL

## 2017-09-14 ENCOUNTER — HOSPITAL ENCOUNTER (OUTPATIENT)
Dept: INFUSION THERAPY | Facility: HOSPITAL | Age: 22
Discharge: HOME | End: 2017-09-14
Attending: PEDIATRICS
Payer: COMMERCIAL

## 2017-09-14 VITALS
SYSTOLIC BLOOD PRESSURE: 104 MMHG | BODY MASS INDEX: 20.24 KG/M2 | RESPIRATION RATE: 20 BRPM | DIASTOLIC BLOOD PRESSURE: 97 MMHG | HEART RATE: 80 BPM | TEMPERATURE: 98 F | WEIGHT: 110 LBS | HEIGHT: 62 IN

## 2017-09-14 DIAGNOSIS — K51.00 ULCERATIVE PANCOLITIS WITHOUT COMPLICATION: ICD-10-CM

## 2017-09-14 DIAGNOSIS — R70.0 ELEVATED SEDIMENTATION RATE: ICD-10-CM

## 2017-09-14 LAB
BASOPHILS # BLD AUTO: 0.04 K/UL
BASOPHILS NFR BLD: 0.5 %
DIFFERENTIAL METHOD: NORMAL
EOSINOPHIL # BLD AUTO: 0.2 K/UL
EOSINOPHIL NFR BLD: 2 %
ERYTHROCYTE [DISTWIDTH] IN BLOOD BY AUTOMATED COUNT: 13.8 %
ERYTHROCYTE [SEDIMENTATION RATE] IN BLOOD BY WESTERGREN METHOD: 10 MM/HR
HCT VFR BLD AUTO: 39.2 %
HGB BLD-MCNC: 13 G/DL
LYMPHOCYTES # BLD AUTO: 2.4 K/UL
LYMPHOCYTES NFR BLD: 33 %
MCH RBC QN AUTO: 29 PG
MCHC RBC AUTO-ENTMCNC: 33.2 G/DL
MCV RBC AUTO: 87 FL
MONOCYTES # BLD AUTO: 0.7 K/UL
MONOCYTES NFR BLD: 9.1 %
NEUTROPHILS # BLD AUTO: 4.1 K/UL
NEUTROPHILS NFR BLD: 55.3 %
PLATELET # BLD AUTO: 334 K/UL
PMV BLD AUTO: 11.5 FL
RBC # BLD AUTO: 4.49 M/UL
WBC # BLD AUTO: 7.4 K/UL

## 2017-09-14 PROCEDURE — 85025 COMPLETE CBC W/AUTO DIFF WBC: CPT

## 2017-09-14 PROCEDURE — 96413 CHEMO IV INFUSION 1 HR: CPT | Mod: PO

## 2017-09-14 PROCEDURE — 63600175 PHARM REV CODE 636 W HCPCS: Mod: PO | Performed by: PEDIATRICS

## 2017-09-14 PROCEDURE — 85651 RBC SED RATE NONAUTOMATED: CPT

## 2017-09-14 PROCEDURE — A4216 STERILE WATER/SALINE, 10 ML: HCPCS | Mod: PO | Performed by: PEDIATRICS

## 2017-09-14 PROCEDURE — 96415 CHEMO IV INFUSION ADDL HR: CPT | Mod: PO

## 2017-09-14 PROCEDURE — 25000003 PHARM REV CODE 250: Mod: PO | Performed by: PEDIATRICS

## 2017-09-14 RX ORDER — SODIUM CHLORIDE 9 MG/ML
INJECTION, SOLUTION INTRAVENOUS ONCE
Status: CANCELLED | OUTPATIENT
Start: 2017-09-14 | End: 2017-09-14

## 2017-09-14 RX ORDER — ACETAMINOPHEN 325 MG/1
650 TABLET ORAL
Status: DISCONTINUED | OUTPATIENT
Start: 2017-09-14 | End: 2017-09-15 | Stop reason: HOSPADM

## 2017-09-14 RX ORDER — SODIUM CHLORIDE 9 MG/ML
INJECTION, SOLUTION INTRAVENOUS ONCE
Status: COMPLETED | OUTPATIENT
Start: 2017-09-14 | End: 2017-09-14

## 2017-09-14 RX ORDER — DIPHENHYDRAMINE HCL 25 MG
25 CAPSULE ORAL ONCE
Status: COMPLETED | OUTPATIENT
Start: 2017-09-14 | End: 2017-09-14

## 2017-09-14 RX ORDER — DIPHENHYDRAMINE HCL 25 MG
25 CAPSULE ORAL ONCE
Status: CANCELLED | OUTPATIENT
Start: 2017-09-14 | End: 2017-09-14

## 2017-09-14 RX ORDER — SODIUM CHLORIDE 0.9 % (FLUSH) 0.9 %
5 SYRINGE (ML) INJECTION
Status: DISCONTINUED | OUTPATIENT
Start: 2017-09-14 | End: 2017-09-15 | Stop reason: HOSPADM

## 2017-09-14 RX ORDER — ACETAMINOPHEN 325 MG/1
650 TABLET ORAL
Status: CANCELLED | OUTPATIENT
Start: 2017-09-14 | End: 2017-09-14

## 2017-09-14 RX ORDER — SODIUM CHLORIDE 0.9 % (FLUSH) 0.9 %
5 SYRINGE (ML) INJECTION
Status: CANCELLED | OUTPATIENT
Start: 2017-09-14

## 2017-09-14 RX ADMIN — SODIUM CHLORIDE: 9 INJECTION, SOLUTION INTRAVENOUS at 11:09

## 2017-09-14 RX ADMIN — SODIUM CHLORIDE, PRESERVATIVE FREE 5 ML: 5 INJECTION INTRAVENOUS at 09:09

## 2017-09-14 RX ADMIN — DIPHENHYDRAMINE HYDROCHLORIDE 25 MG: 25 CAPSULE ORAL at 09:09

## 2017-09-14 RX ADMIN — INFLIXIMAB 250 MG: 100 INJECTION, POWDER, LYOPHILIZED, FOR SOLUTION INTRAVENOUS at 09:09

## 2017-09-14 NOTE — PLAN OF CARE
Problem: Patient Care Overview (Adult)  Goal: Plan of Care Review  1.  Pt likes IV in right arm.  2.  Pt likes to listen to music + read.   Outcome: Ongoing (interventions implemented as appropriate)  Pt stated that she has been doing well.  No problems reported today,  She has been working a lot, + tolerated remicade infusion without complications today.

## 2017-09-14 NOTE — PROGRESS NOTES
Remicade infusion complete @ this time.  Pt tolerated infusion without difficulty.  No S+S of adverse reactions noted.  Vital signs stable, afebrile throughout infusion.  IV to left forearm d/c'd.  Catheter intact.  Pressure dressing with gauze + coban applied to site.  Pt tolerated procedure well.  Mom instructed to return to clinic in 10 weeks, + to call clinic with any problems or concerns.  Mom repeated back instructions, + verbalized complete understanding.

## 2017-09-15 ENCOUNTER — TELEPHONE (OUTPATIENT)
Dept: PEDIATRIC GASTROENTEROLOGY | Facility: CLINIC | Age: 22
End: 2017-09-15

## 2017-09-15 NOTE — TELEPHONE ENCOUNTER
----- Message from Mehdi JAY Kareen sent at 9/15/2017  9:37 AM CDT -----  Contact: Lab Client Services  Hi my name is Mehdi I work in the lab Client Services. We had a problem with some lab work on this patient. If someone from your office could call us at [phone] or [phone] that would be great. Anyone in my department can help. Thank You

## 2017-11-22 ENCOUNTER — HOSPITAL ENCOUNTER (OUTPATIENT)
Dept: INFUSION THERAPY | Facility: HOSPITAL | Age: 22
Discharge: HOME | End: 2017-11-22
Attending: PEDIATRICS
Payer: COMMERCIAL

## 2017-11-22 VITALS
TEMPERATURE: 98 F | BODY MASS INDEX: 20.67 KG/M2 | HEART RATE: 57 BPM | DIASTOLIC BLOOD PRESSURE: 76 MMHG | RESPIRATION RATE: 20 BRPM | HEIGHT: 62 IN | SYSTOLIC BLOOD PRESSURE: 135 MMHG | WEIGHT: 112.31 LBS

## 2017-11-22 DIAGNOSIS — K51.00 ULCERATIVE PANCOLITIS WITHOUT COMPLICATION: ICD-10-CM

## 2017-11-22 DIAGNOSIS — R70.0 ELEVATED SEDIMENTATION RATE: ICD-10-CM

## 2017-11-22 LAB
ALBUMIN SERPL BCP-MCNC: 3.7 G/DL
ALP SERPL-CCNC: 91 U/L
ALT SERPL W/O P-5'-P-CCNC: 10 U/L
ANION GAP SERPL CALC-SCNC: 7 MMOL/L
AST SERPL-CCNC: 23 U/L
BASOPHILS # BLD AUTO: 0.06 K/UL
BASOPHILS NFR BLD: 0.9 %
BILIRUB SERPL-MCNC: 0.2 MG/DL
BUN SERPL-MCNC: 10 MG/DL
CALCIUM SERPL-MCNC: 9.4 MG/DL
CHLORIDE SERPL-SCNC: 105 MMOL/L
CO2 SERPL-SCNC: 27 MMOL/L
CREAT SERPL-MCNC: 0.7 MG/DL
CRP SERPL-MCNC: 0.92 MG/L
DIFFERENTIAL METHOD: ABNORMAL
EOSINOPHIL # BLD AUTO: 0.3 K/UL
EOSINOPHIL NFR BLD: 3.9 %
ERYTHROCYTE [DISTWIDTH] IN BLOOD BY AUTOMATED COUNT: 13.6 %
ERYTHROCYTE [SEDIMENTATION RATE] IN BLOOD BY WESTERGREN METHOD: 22 MM/HR
EST. GFR  (AFRICAN AMERICAN): >60 ML/MIN/1.73 M^2
EST. GFR  (NON AFRICAN AMERICAN): >60 ML/MIN/1.73 M^2
GGT SERPL-CCNC: 22 U/L
GLUCOSE SERPL-MCNC: 86 MG/DL
HCT VFR BLD AUTO: 35.8 %
HGB BLD-MCNC: 11.7 G/DL
IMM GRANULOCYTES # BLD AUTO: 0.01 K/UL
IMM GRANULOCYTES NFR BLD AUTO: 0.2 %
LYMPHOCYTES # BLD AUTO: 2.4 K/UL
LYMPHOCYTES NFR BLD: 37.6 %
MCH RBC QN AUTO: 28.7 PG
MCHC RBC AUTO-ENTMCNC: 32.7 G/DL
MCV RBC AUTO: 88 FL
MONOCYTES # BLD AUTO: 0.6 K/UL
MONOCYTES NFR BLD: 8.7 %
NEUTROPHILS # BLD AUTO: 3.1 K/UL
NEUTROPHILS NFR BLD: 48.7 %
NRBC BLD-RTO: 0 /100 WBC
PLATELET # BLD AUTO: 379 K/UL
PMV BLD AUTO: 10.2 FL
POTASSIUM SERPL-SCNC: 4.5 MMOL/L
PROT SERPL-MCNC: 8.1 G/DL
RBC # BLD AUTO: 4.07 M/UL
SODIUM SERPL-SCNC: 139 MMOL/L
WBC # BLD AUTO: 6.41 K/UL

## 2017-11-22 PROCEDURE — 80053 COMPREHEN METABOLIC PANEL: CPT

## 2017-11-22 PROCEDURE — 86141 C-REACTIVE PROTEIN HS: CPT

## 2017-11-22 PROCEDURE — 96413 CHEMO IV INFUSION 1 HR: CPT | Mod: PO

## 2017-11-22 PROCEDURE — 85025 COMPLETE CBC W/AUTO DIFF WBC: CPT

## 2017-11-22 PROCEDURE — 96415 CHEMO IV INFUSION ADDL HR: CPT | Mod: PO

## 2017-11-22 PROCEDURE — A4216 STERILE WATER/SALINE, 10 ML: HCPCS | Mod: PO | Performed by: PEDIATRICS

## 2017-11-22 PROCEDURE — 63600175 PHARM REV CODE 636 W HCPCS: Mod: PO | Performed by: PEDIATRICS

## 2017-11-22 PROCEDURE — 85651 RBC SED RATE NONAUTOMATED: CPT

## 2017-11-22 PROCEDURE — 82977 ASSAY OF GGT: CPT

## 2017-11-22 PROCEDURE — 25000003 PHARM REV CODE 250: Mod: PO | Performed by: PEDIATRICS

## 2017-11-22 RX ORDER — ACETAMINOPHEN 325 MG/1
650 TABLET ORAL
Status: CANCELLED | OUTPATIENT
Start: 2017-11-22 | End: 2017-11-22

## 2017-11-22 RX ORDER — SODIUM CHLORIDE 9 MG/ML
INJECTION, SOLUTION INTRAVENOUS ONCE
Status: CANCELLED | OUTPATIENT
Start: 2017-11-22 | End: 2017-11-22

## 2017-11-22 RX ORDER — SODIUM CHLORIDE 0.9 % (FLUSH) 0.9 %
5 SYRINGE (ML) INJECTION
Status: DISCONTINUED | OUTPATIENT
Start: 2017-11-22 | End: 2017-11-23 | Stop reason: HOSPADM

## 2017-11-22 RX ORDER — DIPHENHYDRAMINE HCL 25 MG
25 CAPSULE ORAL ONCE
Status: COMPLETED | OUTPATIENT
Start: 2017-11-22 | End: 2017-11-22

## 2017-11-22 RX ORDER — SODIUM CHLORIDE 9 MG/ML
INJECTION, SOLUTION INTRAVENOUS ONCE
Status: COMPLETED | OUTPATIENT
Start: 2017-11-22 | End: 2017-11-22

## 2017-11-22 RX ORDER — SODIUM CHLORIDE 0.9 % (FLUSH) 0.9 %
5 SYRINGE (ML) INJECTION
Status: CANCELLED | OUTPATIENT
Start: 2017-11-22

## 2017-11-22 RX ORDER — DIPHENHYDRAMINE HCL 25 MG
25 CAPSULE ORAL ONCE
Status: CANCELLED | OUTPATIENT
Start: 2017-11-22 | End: 2017-11-22

## 2017-11-22 RX ORDER — PROPRANOLOL HYDROCHLORIDE 10 MG/1
30 TABLET ORAL
Refills: 0 | COMMUNITY
Start: 2017-09-08 | End: 2019-03-29 | Stop reason: DRUGHIGH

## 2017-11-22 RX ADMIN — DIPHENHYDRAMINE HYDROCHLORIDE 25 MG: 25 CAPSULE ORAL at 09:11

## 2017-11-22 RX ADMIN — INFLIXIMAB 250 MG: 100 INJECTION, POWDER, LYOPHILIZED, FOR SOLUTION INTRAVENOUS at 09:11

## 2017-11-22 RX ADMIN — SODIUM CHLORIDE, PRESERVATIVE FREE 5 ML: 5 INJECTION INTRAVENOUS at 09:11

## 2017-11-22 RX ADMIN — SODIUM CHLORIDE: 9 INJECTION, SOLUTION INTRAVENOUS at 11:11

## 2017-11-22 NOTE — NURSING
Remicade infusion complete. Patient tolerated infusion well. Patient with no S+S of adverse reactions. Patient VSS and afebrile. Patient with  Right AC PIV d/c'd. Gauze and coban applied to site. Site is clean, dry and intact. Next appointment scheduled in 10 weeks. Patient informed to call with questions and concern.

## 2017-11-22 NOTE — PLAN OF CARE
Problem: Patient Care Overview (Adult)  Goal: Plan of Care Review  1.  Pt likes IV in right arm.  2.  Pt likes to listen to music + read.   Outcome: Ongoing (interventions implemented as appropriate)  Patient reports no issues at this time. Patient tolerated infusion well. Patient talked with family throughout the infusion. Updated on plan of care.

## 2018-01-31 ENCOUNTER — OFFICE VISIT (OUTPATIENT)
Dept: PEDIATRIC GASTROENTEROLOGY | Facility: CLINIC | Age: 23
End: 2018-01-31
Payer: COMMERCIAL

## 2018-01-31 ENCOUNTER — HOSPITAL ENCOUNTER (OUTPATIENT)
Dept: INFUSION THERAPY | Facility: HOSPITAL | Age: 23
Discharge: HOME | End: 2018-01-31
Attending: PEDIATRICS
Payer: COMMERCIAL

## 2018-01-31 VITALS
DIASTOLIC BLOOD PRESSURE: 59 MMHG | WEIGHT: 114.63 LBS | SYSTOLIC BLOOD PRESSURE: 102 MMHG | RESPIRATION RATE: 20 BRPM | TEMPERATURE: 98 F | HEART RATE: 76 BPM | BODY MASS INDEX: 21.23 KG/M2

## 2018-01-31 DIAGNOSIS — K51.00 ULCERATIVE PANCOLITIS WITHOUT COMPLICATION: ICD-10-CM

## 2018-01-31 DIAGNOSIS — R70.0 ELEVATED SEDIMENTATION RATE: ICD-10-CM

## 2018-01-31 DIAGNOSIS — K51.00 ULCERATIVE PANCOLITIS WITHOUT COMPLICATION: Primary | ICD-10-CM

## 2018-01-31 DIAGNOSIS — D84.9 IMMUNOSUPPRESSION: ICD-10-CM

## 2018-01-31 LAB
ALBUMIN SERPL BCP-MCNC: 3.8 G/DL
ALP SERPL-CCNC: 89 U/L
ALT SERPL W/O P-5'-P-CCNC: 11 U/L
ANION GAP SERPL CALC-SCNC: 10 MMOL/L
AST SERPL-CCNC: 32 U/L
BASOPHILS # BLD AUTO: 0.06 K/UL
BASOPHILS NFR BLD: 0.7 %
BILIRUB SERPL-MCNC: 0.5 MG/DL
BUN SERPL-MCNC: 7 MG/DL
CALCIUM SERPL-MCNC: 9.3 MG/DL
CHLORIDE SERPL-SCNC: 103 MMOL/L
CO2 SERPL-SCNC: 26 MMOL/L
CREAT SERPL-MCNC: 0.7 MG/DL
CRP SERPL-MCNC: 3.31 MG/L
DIFFERENTIAL METHOD: ABNORMAL
EOSINOPHIL # BLD AUTO: 0.2 K/UL
EOSINOPHIL NFR BLD: 2.2 %
ERYTHROCYTE [DISTWIDTH] IN BLOOD BY AUTOMATED COUNT: 14.2 %
ERYTHROCYTE [SEDIMENTATION RATE] IN BLOOD BY WESTERGREN METHOD: 23 MM/HR
EST. GFR  (AFRICAN AMERICAN): >60 ML/MIN/1.73 M^2
EST. GFR  (NON AFRICAN AMERICAN): >60 ML/MIN/1.73 M^2
GGT SERPL-CCNC: 22 U/L
GLUCOSE SERPL-MCNC: 81 MG/DL
HCT VFR BLD AUTO: 38.1 %
HGB BLD-MCNC: 12 G/DL
IMM GRANULOCYTES # BLD AUTO: 0.04 K/UL
IMM GRANULOCYTES NFR BLD AUTO: 0.5 %
LYMPHOCYTES # BLD AUTO: 2 K/UL
LYMPHOCYTES NFR BLD: 24.3 %
MCH RBC QN AUTO: 28.2 PG
MCHC RBC AUTO-ENTMCNC: 31.5 G/DL
MCV RBC AUTO: 89 FL
MONOCYTES # BLD AUTO: 0.8 K/UL
MONOCYTES NFR BLD: 9.6 %
NEUTROPHILS # BLD AUTO: 5.2 K/UL
NEUTROPHILS NFR BLD: 62.7 %
NRBC BLD-RTO: 0 /100 WBC
PLATELET # BLD AUTO: 324 K/UL
PMV BLD AUTO: 10.5 FL
POTASSIUM SERPL-SCNC: 3.8 MMOL/L
PROT SERPL-MCNC: 8.2 G/DL
RBC # BLD AUTO: 4.26 M/UL
SODIUM SERPL-SCNC: 139 MMOL/L
WBC # BLD AUTO: 8.27 K/UL

## 2018-01-31 PROCEDURE — 80053 COMPREHEN METABOLIC PANEL: CPT

## 2018-01-31 PROCEDURE — 3008F BODY MASS INDEX DOCD: CPT | Mod: S$GLB,,, | Performed by: PEDIATRICS

## 2018-01-31 PROCEDURE — 99999 PR PBB SHADOW E&M-EST. PATIENT-LVL II: CPT | Mod: PBBFAC,,, | Performed by: PEDIATRICS

## 2018-01-31 PROCEDURE — 82977 ASSAY OF GGT: CPT

## 2018-01-31 PROCEDURE — A4216 STERILE WATER/SALINE, 10 ML: HCPCS | Mod: PO | Performed by: PEDIATRICS

## 2018-01-31 PROCEDURE — 85651 RBC SED RATE NONAUTOMATED: CPT

## 2018-01-31 PROCEDURE — 99214 OFFICE O/P EST MOD 30 MIN: CPT | Mod: S$GLB,,, | Performed by: PEDIATRICS

## 2018-01-31 PROCEDURE — 25000003 PHARM REV CODE 250: Mod: PO | Performed by: PEDIATRICS

## 2018-01-31 PROCEDURE — 96415 CHEMO IV INFUSION ADDL HR: CPT | Mod: PO

## 2018-01-31 PROCEDURE — 85025 COMPLETE CBC W/AUTO DIFF WBC: CPT

## 2018-01-31 PROCEDURE — 63600175 PHARM REV CODE 636 W HCPCS: Mod: JG,PO | Performed by: PEDIATRICS

## 2018-01-31 PROCEDURE — 96413 CHEMO IV INFUSION 1 HR: CPT | Mod: PO

## 2018-01-31 PROCEDURE — 86141 C-REACTIVE PROTEIN HS: CPT

## 2018-01-31 RX ORDER — SODIUM CHLORIDE 9 MG/ML
INJECTION, SOLUTION INTRAVENOUS ONCE
Status: CANCELLED | OUTPATIENT
Start: 2018-01-31 | End: 2018-01-31

## 2018-01-31 RX ORDER — SODIUM CHLORIDE 0.9 % (FLUSH) 0.9 %
5 SYRINGE (ML) INJECTION
Status: CANCELLED | OUTPATIENT
Start: 2018-01-31

## 2018-01-31 RX ORDER — DIPHENHYDRAMINE HCL 25 MG
25 CAPSULE ORAL ONCE
Status: COMPLETED | OUTPATIENT
Start: 2018-01-31 | End: 2018-01-31

## 2018-01-31 RX ORDER — ACETAMINOPHEN 325 MG/1
650 TABLET ORAL
Status: DISCONTINUED | OUTPATIENT
Start: 2018-01-31 | End: 2018-02-01 | Stop reason: HOSPADM

## 2018-01-31 RX ORDER — SODIUM CHLORIDE 0.9 % (FLUSH) 0.9 %
5 SYRINGE (ML) INJECTION
Status: DISCONTINUED | OUTPATIENT
Start: 2018-01-31 | End: 2018-02-01 | Stop reason: HOSPADM

## 2018-01-31 RX ORDER — SODIUM CHLORIDE 9 MG/ML
INJECTION, SOLUTION INTRAVENOUS ONCE
Status: COMPLETED | OUTPATIENT
Start: 2018-01-31 | End: 2018-01-31

## 2018-01-31 RX ORDER — DIPHENHYDRAMINE HCL 25 MG
25 CAPSULE ORAL ONCE
Status: CANCELLED | OUTPATIENT
Start: 2018-01-31 | End: 2018-01-31

## 2018-01-31 RX ORDER — ACETAMINOPHEN 325 MG/1
650 TABLET ORAL
Status: CANCELLED | OUTPATIENT
Start: 2018-01-31 | End: 2018-01-31

## 2018-01-31 RX ADMIN — SODIUM CHLORIDE: 900 INJECTION, SOLUTION INTRAVENOUS at 12:01

## 2018-01-31 RX ADMIN — SODIUM CHLORIDE, PRESERVATIVE FREE 5 ML: 5 INJECTION INTRAVENOUS at 09:01

## 2018-01-31 RX ADMIN — INFLIXIMAB 250 MG: 100 INJECTION, POWDER, LYOPHILIZED, FOR SOLUTION INTRAVENOUS at 09:01

## 2018-01-31 RX ADMIN — DIPHENHYDRAMINE HYDROCHLORIDE 25 MG: 25 CAPSULE ORAL at 09:01

## 2018-01-31 NOTE — LETTER
February 6, 2018        Zainab Eagle MD  [address]  [place] [address]             [facility]  [address]  [place] [address]  Phone: [phone]   Patient: Fatoumata Soriano   MR Number: [id number]   YOB: 1995   Date of Visit: 1/31/2018       Dear Dr. Eagle:    Thank you for referring Fatoumata Soriano to me for evaluation. Attached you will find relevant portions of my assessment and plan of care.    If you have questions, please do not hesitate to call me. I look forward to following Fatoumata Soriano along with you.    Sincerely,      Johnnie Hicks MD            CC  No Recipients    Enclosure

## 2018-01-31 NOTE — PLAN OF CARE
Problem: Patient Care Overview (Adult)  Goal: Plan of Care Review  1.  Pt likes IV in right arm.  2.  Pt likes to listen to music + read.   Outcome: Ongoing (interventions implemented as appropriate)  Patient reports no issues at this time. Patient tolerated infusion well with no S+S of adverse reactions. Vital signs stable and afebrile throughout the infusion. Patient talked with family throughout the infusion while sitting comfortably in chair. Patient updated on plan of care.

## 2018-01-31 NOTE — NURSING
Remicade infusion complete. Patient tolerated infusion well. Patient with no S+S of adverse reactions. Patient's vital signs stable and afebrile. Patient with right AC PIV d/c'd. Gauze and coban applied to site. Site is clean, dry and intact. Next appointment scheduled in 10 weeks. Patient informed to call with questions and concerns. Patient verbalized understanding.

## 2018-02-07 NOTE — PATIENT INSTRUCTIONS
Remicade today  Colonoscopy in next few months  Level with next infusion  Follow up 10 weeks for next infusion

## 2018-02-07 NOTE — PROGRESS NOTES
Subjective:       Patient ID: Fatoumata Soriano is a 22 y.o. female.    Chief Complaint: No chief complaint on file.    HPI  Review of Systems   Constitutional: Negative for activity change, appetite change, fatigue, fever and unexpected weight change.   HENT: Negative for congestion, hearing loss, mouth sores, rhinorrhea, sneezing, sore throat and trouble swallowing.    Eyes: Negative for photophobia and visual disturbance.   Respiratory: Negative for apnea, cough, choking, chest tightness, shortness of breath, wheezing and stridor.    Cardiovascular: Negative for chest pain.   Gastrointestinal: Negative for blood in stool, constipation and diarrhea.   Endocrine: Negative for cold intolerance and heat intolerance.   Genitourinary: Negative for decreased urine volume, dysuria and menstrual problem.   Musculoskeletal: Negative for arthralgias, back pain, joint swelling, myalgias, neck pain and neck stiffness.   Skin: Negative for pallor and rash.   Allergic/Immunologic: Negative for food allergies.   Neurological: Negative for seizures, weakness and headaches.   Hematological: Negative for adenopathy. Does not bruise/bleed easily.   Psychiatric/Behavioral: Negative for agitation and sleep disturbance. The patient is not nervous/anxious and is not hyperactive.        Objective:      Physical Exam    Assessment:       1. Ulcerative pancolitis without complication    2. Immunosuppression        Plan:       CHIEF COMPLAINT: Patient is here for follow up of ulcerative colitis.    HISTORY OF PRESENT ILLNESS: Patient follows up today for ongoing care of above symptoms and for her Remicade infusion.  Patient reports doing well.  She reports no abdominal pain.  There is no diarrhea or blood in the stool.  She has tolerated her infusions well.  Her last colonoscopy was in 2014.  We had discussed scheduling one again soon.  She is planning on applying to the [employer].  Family has been in discussions with moving back to  [place].  There is no immediate plan for this at this time.  Patient's menstrual cycles are regular.    STUDIES REVIEWED: CRP 3.3, CRP 3.3, sedimentation rate 23, H&H 12 and 38 white count 8.3 platelets 324 normal CMP    MEDICATIONS/ALLERGIES: The patient's MedCard has been reviewed and/or reconciled.    PMH, SH, FH, all reviewed and no changes except as noted.    PHYSICAL EXAMINATION:     General: Alert, WN, WH, NAD  Chest: Clear to auscultation bilaterally.No increased work of breathing   Heart: Regular, rate and rhythm without murmur  Abdomen: Soft, non tender, non distended, positive bowel sounds, no hepatosplenomegaly, no stool masses, no rebound or guarding.  Extremities: Symmetric, well perfused and no edema.  Tattoo on left arm    Patient was admitted to the infusion center and an IV was started. Patient was premedicated with tylenol and benadryl. Patient was then infused with 250 mg of Remicade per protocol. Patient tolerated the infusion well, the IV was removed, and patient was discharged.  IMPRESSION/PLAN: Patient follows up today for ongoing care of above symptoms.  Clinically she seems to be doing well.  Her inflammatory markers were just above cutoff.  Patient is at a long interval between infusions as this was set out prior to seeing me a few years back.  Her last level was a little low at 54.2.  I will check a level at next infusion.  I certainly think it's time to repeat colonoscopy to follow-up.  She is certainly getting to the age where colon cancer screening should began.  Patient was actually diagnosed 13 years ago.  Her colon cancer risk certainly increases yearly now.I discussed the risk benefits and alternatives of the procedure including sedation by anesthesia and risk of perforating or bruising the organs of the GI tract with the caretaker who verbalized understanding of the plan and risk associated and agreed to proceed. Consent will be obtained at time of endoscopy.  I will recheck a  level at next infusion as her last was a little low.  Her inflammatory markers were just above cutoff at this time which could represent some underlying inflammation.    Patient Instructions   Remicade today  Colonoscopy in next few months  Level with next infusion  Follow up 10 weeks for next infusion      This was discussed at length with parents who expressed understanding and agreement. Questions were answered.  This note has been dictated using voice recognition software.

## 2018-02-09 ENCOUNTER — TELEPHONE (OUTPATIENT)
Dept: PEDIATRIC GASTROENTEROLOGY | Facility: CLINIC | Age: 23
End: 2018-02-09

## 2018-02-09 NOTE — TELEPHONE ENCOUNTER
Called pt to discuss setting up colonoscopy. Pt states she will call back when ready to schedule scope.

## 2018-04-11 ENCOUNTER — LAB VISIT (OUTPATIENT)
Dept: LAB | Facility: HOSPITAL | Age: 23
End: 2018-04-11
Attending: PEDIATRICS
Payer: COMMERCIAL

## 2018-04-11 ENCOUNTER — OFFICE VISIT (OUTPATIENT)
Dept: PEDIATRIC GASTROENTEROLOGY | Facility: CLINIC | Age: 23
End: 2018-04-11
Payer: COMMERCIAL

## 2018-04-11 ENCOUNTER — HOSPITAL ENCOUNTER (OUTPATIENT)
Dept: INFUSION THERAPY | Facility: HOSPITAL | Age: 23
Discharge: HOME | End: 2018-04-11
Attending: PEDIATRICS
Payer: COMMERCIAL

## 2018-04-11 VITALS
DIASTOLIC BLOOD PRESSURE: 67 MMHG | RESPIRATION RATE: 20 BRPM | SYSTOLIC BLOOD PRESSURE: 104 MMHG | HEIGHT: 62 IN | WEIGHT: 114 LBS | BODY MASS INDEX: 20.98 KG/M2 | HEART RATE: 64 BPM | TEMPERATURE: 98 F

## 2018-04-11 DIAGNOSIS — D84.9 IMMUNOSUPPRESSION: ICD-10-CM

## 2018-04-11 DIAGNOSIS — K51.00 ULCERATIVE PANCOLITIS WITHOUT COMPLICATION: Primary | ICD-10-CM

## 2018-04-11 DIAGNOSIS — M85.9 LOW BONE DENSITY FOR AGE: ICD-10-CM

## 2018-04-11 DIAGNOSIS — R70.0 ELEVATED SEDIMENTATION RATE: ICD-10-CM

## 2018-04-11 DIAGNOSIS — K51.00 ULCERATIVE PANCOLITIS WITHOUT COMPLICATION: ICD-10-CM

## 2018-04-11 LAB
ALBUMIN SERPL BCP-MCNC: 4.1 G/DL
ALP SERPL-CCNC: 94 U/L
ALT SERPL W/O P-5'-P-CCNC: 12 U/L
ANION GAP SERPL CALC-SCNC: 6 MMOL/L
AST SERPL-CCNC: 24 U/L
BASOPHILS # BLD AUTO: 0.05 K/UL
BASOPHILS NFR BLD: 0.8 %
BILIRUB SERPL-MCNC: 0.4 MG/DL
BUN SERPL-MCNC: 7 MG/DL
CALCIUM SERPL-MCNC: 9.4 MG/DL
CHLORIDE SERPL-SCNC: 101 MMOL/L
CO2 SERPL-SCNC: 28 MMOL/L
CREAT SERPL-MCNC: 0.8 MG/DL
CRP SERPL-MCNC: 0.29 MG/L
DIFFERENTIAL METHOD: ABNORMAL
EOSINOPHIL # BLD AUTO: 0.1 K/UL
EOSINOPHIL NFR BLD: 2.1 %
ERYTHROCYTE [DISTWIDTH] IN BLOOD BY AUTOMATED COUNT: 14.5 %
ERYTHROCYTE [SEDIMENTATION RATE] IN BLOOD BY WESTERGREN METHOD: 9 MM/HR
EST. GFR  (AFRICAN AMERICAN): >60 ML/MIN/1.73 M^2
EST. GFR  (NON AFRICAN AMERICAN): >60 ML/MIN/1.73 M^2
GGT SERPL-CCNC: 19 U/L
GLUCOSE SERPL-MCNC: 79 MG/DL
HCT VFR BLD AUTO: 38.3 %
HGB BLD-MCNC: 11.9 G/DL
IMM GRANULOCYTES # BLD AUTO: 0.01 K/UL
IMM GRANULOCYTES NFR BLD AUTO: 0.2 %
LYMPHOCYTES # BLD AUTO: 2.8 K/UL
LYMPHOCYTES NFR BLD: 45.3 %
MCH RBC QN AUTO: 27.2 PG
MCHC RBC AUTO-ENTMCNC: 31.1 G/DL
MCV RBC AUTO: 88 FL
MONOCYTES # BLD AUTO: 0.5 K/UL
MONOCYTES NFR BLD: 8 %
NEUTROPHILS # BLD AUTO: 2.7 K/UL
NEUTROPHILS NFR BLD: 43.6 %
NRBC BLD-RTO: 0 /100 WBC
PLATELET # BLD AUTO: 373 K/UL
PMV BLD AUTO: 10.4 FL
POTASSIUM SERPL-SCNC: 4.5 MMOL/L
PROT SERPL-MCNC: 8.5 G/DL
RBC # BLD AUTO: 4.37 M/UL
SODIUM SERPL-SCNC: 135 MMOL/L
WBC # BLD AUTO: 6.22 K/UL

## 2018-04-11 PROCEDURE — A4216 STERILE WATER/SALINE, 10 ML: HCPCS | Mod: PO | Performed by: PEDIATRICS

## 2018-04-11 PROCEDURE — 63600175 PHARM REV CODE 636 W HCPCS: Mod: JG,PO | Performed by: PEDIATRICS

## 2018-04-11 PROCEDURE — 96415 CHEMO IV INFUSION ADDL HR: CPT | Mod: PO

## 2018-04-11 PROCEDURE — 84999 UNLISTED CHEMISTRY PROCEDURE: CPT

## 2018-04-11 PROCEDURE — 85651 RBC SED RATE NONAUTOMATED: CPT

## 2018-04-11 PROCEDURE — 85025 COMPLETE CBC W/AUTO DIFF WBC: CPT

## 2018-04-11 PROCEDURE — 36415 COLL VENOUS BLD VENIPUNCTURE: CPT | Mod: PO

## 2018-04-11 PROCEDURE — 80053 COMPREHEN METABOLIC PANEL: CPT

## 2018-04-11 PROCEDURE — 25000003 PHARM REV CODE 250: Mod: PO | Performed by: PEDIATRICS

## 2018-04-11 PROCEDURE — 99999 PR PBB SHADOW E&M-EST. PATIENT-LVL II: CPT | Mod: PBBFAC,,, | Performed by: PEDIATRICS

## 2018-04-11 PROCEDURE — 99214 OFFICE O/P EST MOD 30 MIN: CPT | Mod: S$GLB,,, | Performed by: PEDIATRICS

## 2018-04-11 PROCEDURE — 96413 CHEMO IV INFUSION 1 HR: CPT | Mod: PO

## 2018-04-11 PROCEDURE — 86141 C-REACTIVE PROTEIN HS: CPT

## 2018-04-11 PROCEDURE — 82977 ASSAY OF GGT: CPT

## 2018-04-11 RX ORDER — SODIUM CHLORIDE 0.9 % (FLUSH) 0.9 %
5 SYRINGE (ML) INJECTION
Status: DISCONTINUED | OUTPATIENT
Start: 2018-04-11 | End: 2018-04-12 | Stop reason: HOSPADM

## 2018-04-11 RX ORDER — SODIUM CHLORIDE 0.9 % (FLUSH) 0.9 %
5 SYRINGE (ML) INJECTION
Status: CANCELLED | OUTPATIENT
Start: 2018-04-11

## 2018-04-11 RX ORDER — ACETAMINOPHEN 325 MG/1
650 TABLET ORAL
Status: DISCONTINUED | OUTPATIENT
Start: 2018-04-11 | End: 2018-04-12 | Stop reason: HOSPADM

## 2018-04-11 RX ORDER — DIPHENHYDRAMINE HCL 25 MG
25 CAPSULE ORAL ONCE
Status: COMPLETED | OUTPATIENT
Start: 2018-04-11 | End: 2018-04-11

## 2018-04-11 RX ORDER — ACETAMINOPHEN 325 MG/1
650 TABLET ORAL
Status: CANCELLED | OUTPATIENT
Start: 2018-04-11 | End: 2018-04-11

## 2018-04-11 RX ORDER — SODIUM CHLORIDE 9 MG/ML
INJECTION, SOLUTION INTRAVENOUS ONCE
Status: COMPLETED | OUTPATIENT
Start: 2018-04-11 | End: 2018-04-11

## 2018-04-11 RX ORDER — DIPHENHYDRAMINE HCL 25 MG
25 CAPSULE ORAL ONCE
Status: CANCELLED | OUTPATIENT
Start: 2018-04-11 | End: 2018-04-11

## 2018-04-11 RX ORDER — SODIUM CHLORIDE 9 MG/ML
INJECTION, SOLUTION INTRAVENOUS ONCE
Status: CANCELLED | OUTPATIENT
Start: 2018-04-11 | End: 2018-04-11

## 2018-04-11 RX ORDER — TRAZODONE HYDROCHLORIDE 50 MG/1
50 TABLET ORAL NIGHTLY
COMMUNITY

## 2018-04-11 RX ADMIN — Medication 5 ML: at 09:04

## 2018-04-11 RX ADMIN — INFLIXIMAB 250 MG: 100 INJECTION, POWDER, LYOPHILIZED, FOR SOLUTION INTRAVENOUS at 09:04

## 2018-04-11 RX ADMIN — SODIUM CHLORIDE 30 ML: 9 INJECTION, SOLUTION INTRAVENOUS at 11:04

## 2018-04-11 RX ADMIN — DIPHENHYDRAMINE HYDROCHLORIDE 25 MG: 25 CAPSULE ORAL at 09:04

## 2018-04-11 NOTE — NURSING
Remicade infusion complete. Patient tolerated infusion. Patient with no signs/symptoms of adverse reaction. Patient vital signs stable, afebrile. PIV d/c'd, catheter intact. Dry gauze and pressure dressing applied to site. Next appointment scheduled and confirmed with patient and mother. Patient informed to call with questions and concerns, Fatoumata verbalized understanding.

## 2018-04-11 NOTE — LETTER
April 16, 2018        Zainab Eagle MD  [address]  [place] [address]             [facility]  [address]  [place] [address]  Phone: [phone]   Patient: Fatoumata Soriano   MR Number: [id number]   YOB: 1995   Date of Visit: 4/11/2018       Dear Dr. Eagle:    Thank you for referring Fatoumata Soriano to me for evaluation. Attached you will find relevant portions of my assessment and plan of care.    If you have questions, please do not hesitate to call me. I look forward to following Fatoumata Soriano along with you.    Sincerely,      Johnnie Hicks MD            CC  No Recipients    Enclosure

## 2018-04-13 LAB — ANSER IFX,SUBSEQUENT: NORMAL

## 2018-04-17 ENCOUNTER — TELEPHONE (OUTPATIENT)
Dept: INFUSION THERAPY | Facility: HOSPITAL | Age: 23
End: 2018-04-17

## 2018-04-17 NOTE — PATIENT INSTRUCTIONS
Remicade today  Repeat colonoscopy by this summer  Yearly flu shots  Yearly TB testing  All age appropriate non-live vaccinations including HPV, meningococcal, pneumovax, prevnar 13 if not given  Repeat Dexa scan  Return in 10 weeks for next infusion

## 2018-04-17 NOTE — PROGRESS NOTES
Subjective:       Patient ID: Fatoumata Soriano is a 22 y.o. female.    Chief Complaint: No chief complaint on file.    HPI  Review of Systems   Constitutional: Negative for activity change, appetite change, fatigue, fever and unexpected weight change.   HENT: Negative for congestion, hearing loss, mouth sores, rhinorrhea, sneezing, sore throat and trouble swallowing.    Eyes: Negative for photophobia and visual disturbance.   Respiratory: Negative for apnea, cough, choking, chest tightness, shortness of breath, wheezing and stridor.    Cardiovascular: Negative for chest pain.   Gastrointestinal: Negative for blood in stool, constipation and diarrhea.   Endocrine: Negative for cold intolerance and heat intolerance.   Genitourinary: Negative for decreased urine volume, dysuria and menstrual problem.   Musculoskeletal: Negative for arthralgias, back pain, joint swelling, myalgias, neck pain and neck stiffness.   Skin: Negative for pallor and rash.   Allergic/Immunologic: Negative for food allergies.   Neurological: Negative for seizures, weakness and headaches.   Hematological: Negative for adenopathy. Does not bruise/bleed easily.   Psychiatric/Behavioral: Negative for agitation and sleep disturbance. The patient is not nervous/anxious and is not hyperactive.        Objective:      Physical Exam    Assessment:       1. Ulcerative pancolitis without complication    2. Immunosuppression    3. Low bone density for age        Plan:       CHIEF COMPLAINT: Patient is here for follow up of ulcerative colitis.    HISTORY OF PRESENT ILLNESS: Patient follows up today for ongoing care of her ulcerative colitis and for her Remicade infusion.  She reports no abdominal pain.  Bowel movements are normal 1-2 times a day.  There is no blood or diarrhea.  There are no fevers.  There are no joint pains or complaints.  Her menstrual cycles are regular.  She is about to start school again.  She plans on going to community college and then  transferring.  The family is still considering moving to [place] at some point.  She is tolerating her infusions without difficulty.  She plans to schedule her colonoscopy this summer.  Last colonoscopy was in 2014 and was normal.  There was some question of some mild changes microscopically there likely normal.    STUDIES REVIEWED: CRP 0.3, GGT 19, normal CMP, H&H 11.9 and 38.3 platelets 373 white count 6.2 sedimentation rate of 9, Remicade level VI.1 with no antibodies    MEDICATIONS/ALLERGIES: The patient's MedCard has been reviewed and/or reconciled.    PMH, SH, FH, all reviewed and no changes except as noted.    PHYSICAL EXAMINATION:     General: Alert, WN, WH, NAD  Chest: Clear to auscultation bilaterally.No increased work of breathing   Heart: Regular, rate and rhythm without murmur  Abdomen: Soft, non tender, non distended, positive bowel sounds, no hepatosplenomegaly, no stool masses, no rebound or guarding.  Extremities: Symmetric, well perfused and no edema.    Patient was admitted to the infusion center and an IV was started. Patient was premedicated with tylenol and benadryl. Patient was then infused with 250 mg of Remicade per protocol. Patient tolerated the infusion well, the IV was removed, and patient was discharged.  IMPRESSION/PLAN: Patient follows up today for ongoing care of above symptoms and for her Remicade infusion.  Clinically she has been doing well.  She is not having any outward symptoms.  She is due for repeat colonoscopy.  It is okay if she schedules this this summer.  She has had low bone mineral density in the past as well.  It has been a while since her last DEXA scan.  I would like to schedule her for this as well.  She needs health maintenance items as listed below including yearly flu shots and TB testing.  I have ordered a QuantiFERON for her next infusion.  Her Remicade level is okay at 6.1.  I certainly would have low hesitation to increase her dosage especially of there is  any evidence of active inflammation.  I will see her back in 10 weeks for her next infusion.    Patient Instructions   Remicade today  Repeat colonoscopy by this summer  Yearly flu shots  Yearly TB testing  All age appropriate non-live vaccinations including HPV, meningococcal, pneumovax, prevnar 13 if not given  Repeat Dexa scan  Return in 10 weeks for next infusion      This was discussed at length with parents who expressed understanding and agreement. Questions were answered.  This note has been dictated using voice recognition software.

## 2018-04-17 NOTE — TELEPHONE ENCOUNTER
----- Message from Lynn Dwyer RN sent at 4/17/2018  9:13 AM CDT -----  Regarding: Labs  Ordered extra labs for next infusion including quantiferon. She also needs a repeat Dexa and to schedule her colonoscopy. MAJO

## 2018-04-17 NOTE — TELEPHONE ENCOUNTER
Spoke to pt, + informed her that Dr. Hicks ordered a bone density test + colonoscopy.  BMD scheduled @ [facility] on next Fri., 4/27/18, @ 1020 as requested per pt.  She stated that she will call back to schedule scope @ another time.

## 2018-06-18 ENCOUNTER — TELEPHONE (OUTPATIENT)
Dept: PEDIATRIC GASTROENTEROLOGY | Facility: CLINIC | Age: 23
End: 2018-06-18

## 2018-06-18 NOTE — TELEPHONE ENCOUNTER
----- Message from Dora Chapa RN sent at 6/18/2018  5:54 PM CDT -----  Regarding: expiring orders  Therapy plan for Remicade will be expiring.  Can you please enter the therapy plan, review, + re-sign the orders?    Thanks,  Dora

## 2018-06-20 ENCOUNTER — HOSPITAL ENCOUNTER (OUTPATIENT)
Dept: INFUSION THERAPY | Facility: HOSPITAL | Age: 23
Discharge: HOME | End: 2018-06-20
Attending: PEDIATRICS
Payer: COMMERCIAL

## 2018-06-20 ENCOUNTER — LAB VISIT (OUTPATIENT)
Dept: LAB | Facility: HOSPITAL | Age: 23
End: 2018-06-20
Attending: PEDIATRICS
Payer: COMMERCIAL

## 2018-06-20 VITALS
SYSTOLIC BLOOD PRESSURE: 102 MMHG | RESPIRATION RATE: 18 BRPM | DIASTOLIC BLOOD PRESSURE: 61 MMHG | WEIGHT: 110.25 LBS | TEMPERATURE: 97 F | HEIGHT: 62 IN | BODY MASS INDEX: 20.29 KG/M2 | HEART RATE: 64 BPM

## 2018-06-20 DIAGNOSIS — R70.0 ELEVATED SEDIMENTATION RATE: ICD-10-CM

## 2018-06-20 DIAGNOSIS — K51.00 ULCERATIVE PANCOLITIS WITHOUT COMPLICATION: ICD-10-CM

## 2018-06-20 DIAGNOSIS — M85.9 LOW BONE DENSITY FOR AGE: ICD-10-CM

## 2018-06-20 DIAGNOSIS — D84.9 IMMUNOSUPPRESSION: ICD-10-CM

## 2018-06-20 LAB
25(OH)D3+25(OH)D2 SERPL-MCNC: 37 NG/ML
ALBUMIN SERPL BCP-MCNC: 4.2 G/DL
ALP SERPL-CCNC: 73 U/L
ALT SERPL W/O P-5'-P-CCNC: 12 U/L
ANION GAP SERPL CALC-SCNC: 9 MMOL/L
AST SERPL-CCNC: 18 U/L
BASOPHILS # BLD AUTO: 0.03 K/UL
BASOPHILS NFR BLD: 0.5 %
BILIRUB SERPL-MCNC: 0.3 MG/DL
BUN SERPL-MCNC: 12 MG/DL
CALCIUM SERPL-MCNC: 9.7 MG/DL
CHLORIDE SERPL-SCNC: 103 MMOL/L
CO2 SERPL-SCNC: 26 MMOL/L
CREAT SERPL-MCNC: 0.8 MG/DL
CRP SERPL-MCNC: 5.81 MG/L
DIFFERENTIAL METHOD: ABNORMAL
EOSINOPHIL # BLD AUTO: 0.2 K/UL
EOSINOPHIL NFR BLD: 3.1 %
ERYTHROCYTE [DISTWIDTH] IN BLOOD BY AUTOMATED COUNT: 15.6 %
ERYTHROCYTE [SEDIMENTATION RATE] IN BLOOD BY WESTERGREN METHOD: 29 MM/HR
EST. GFR  (AFRICAN AMERICAN): >60 ML/MIN/1.73 M^2
EST. GFR  (NON AFRICAN AMERICAN): >60 ML/MIN/1.73 M^2
FOLATE SERPL-MCNC: 15.4 NG/ML
GGT SERPL-CCNC: 23 U/L
GLUCOSE SERPL-MCNC: 80 MG/DL
HCT VFR BLD AUTO: 37.7 %
HGB BLD-MCNC: 12 G/DL
IMM GRANULOCYTES # BLD AUTO: 0.02 K/UL
IMM GRANULOCYTES NFR BLD AUTO: 0.3 %
IRON SERPL-MCNC: 133 UG/DL
LYMPHOCYTES # BLD AUTO: 2.8 K/UL
LYMPHOCYTES NFR BLD: 45.1 %
MCH RBC QN AUTO: 28.2 PG
MCHC RBC AUTO-ENTMCNC: 31.8 G/DL
MCV RBC AUTO: 89 FL
MONOCYTES # BLD AUTO: 0.5 K/UL
MONOCYTES NFR BLD: 7.9 %
NEUTROPHILS # BLD AUTO: 2.7 K/UL
NEUTROPHILS NFR BLD: 43.1 %
NRBC BLD-RTO: 0 /100 WBC
PLATELET # BLD AUTO: 402 K/UL
PMV BLD AUTO: 10.3 FL
POTASSIUM SERPL-SCNC: 3.8 MMOL/L
PROT SERPL-MCNC: 8.4 G/DL
RBC # BLD AUTO: 4.25 M/UL
SATURATED IRON: 31 %
SODIUM SERPL-SCNC: 138 MMOL/L
TOTAL IRON BINDING CAPACITY: 435 UG/DL
TRANSFERRIN SERPL-MCNC: 294 MG/DL
VIT B12 SERPL-MCNC: 668 PG/ML
WBC # BLD AUTO: 6.18 K/UL

## 2018-06-20 PROCEDURE — 82977 ASSAY OF GGT: CPT

## 2018-06-20 PROCEDURE — 96415 CHEMO IV INFUSION ADDL HR: CPT | Mod: PO

## 2018-06-20 PROCEDURE — 82746 ASSAY OF FOLIC ACID SERUM: CPT

## 2018-06-20 PROCEDURE — 82607 VITAMIN B-12: CPT

## 2018-06-20 PROCEDURE — 25000003 PHARM REV CODE 250: Mod: PO | Performed by: PEDIATRICS

## 2018-06-20 PROCEDURE — A4216 STERILE WATER/SALINE, 10 ML: HCPCS | Mod: PO | Performed by: PEDIATRICS

## 2018-06-20 PROCEDURE — 80053 COMPREHEN METABOLIC PANEL: CPT

## 2018-06-20 PROCEDURE — 63600175 PHARM REV CODE 636 W HCPCS: Mod: JG,PO | Performed by: PEDIATRICS

## 2018-06-20 PROCEDURE — 83540 ASSAY OF IRON: CPT

## 2018-06-20 PROCEDURE — 86480 TB TEST CELL IMMUN MEASURE: CPT

## 2018-06-20 PROCEDURE — 82306 VITAMIN D 25 HYDROXY: CPT

## 2018-06-20 PROCEDURE — 85025 COMPLETE CBC W/AUTO DIFF WBC: CPT

## 2018-06-20 PROCEDURE — 85651 RBC SED RATE NONAUTOMATED: CPT

## 2018-06-20 PROCEDURE — 36415 COLL VENOUS BLD VENIPUNCTURE: CPT | Mod: PO

## 2018-06-20 PROCEDURE — 96413 CHEMO IV INFUSION 1 HR: CPT | Mod: PO

## 2018-06-20 PROCEDURE — 86141 C-REACTIVE PROTEIN HS: CPT

## 2018-06-20 RX ORDER — SODIUM CHLORIDE 0.9 % (FLUSH) 0.9 %
5 SYRINGE (ML) INJECTION
Status: CANCELLED | OUTPATIENT
Start: 2018-06-20

## 2018-06-20 RX ORDER — DIPHENHYDRAMINE HCL 25 MG
25 CAPSULE ORAL ONCE
Status: COMPLETED | OUTPATIENT
Start: 2018-06-20 | End: 2018-06-20

## 2018-06-20 RX ORDER — SODIUM CHLORIDE 9 MG/ML
INJECTION, SOLUTION INTRAVENOUS ONCE
Status: COMPLETED | OUTPATIENT
Start: 2018-06-20 | End: 2018-06-20

## 2018-06-20 RX ORDER — DIPHENHYDRAMINE HCL 25 MG
25 CAPSULE ORAL ONCE
Status: CANCELLED | OUTPATIENT
Start: 2018-06-20 | End: 2018-06-20

## 2018-06-20 RX ORDER — SODIUM CHLORIDE 0.9 % (FLUSH) 0.9 %
5 SYRINGE (ML) INJECTION
Status: DISCONTINUED | OUTPATIENT
Start: 2018-06-20 | End: 2018-06-21 | Stop reason: HOSPADM

## 2018-06-20 RX ORDER — ACETAMINOPHEN 325 MG/1
650 TABLET ORAL
Status: DISCONTINUED | OUTPATIENT
Start: 2018-06-20 | End: 2018-06-21 | Stop reason: HOSPADM

## 2018-06-20 RX ORDER — ACETAMINOPHEN 325 MG/1
650 TABLET ORAL
Status: CANCELLED | OUTPATIENT
Start: 2018-06-20 | End: 2018-06-20

## 2018-06-20 RX ORDER — NORETHINDRONE 0.35 MG/1
TABLET ORAL
Refills: 0 | COMMUNITY
Start: 2018-05-15 | End: 2019-03-29

## 2018-06-20 RX ORDER — SODIUM CHLORIDE 9 MG/ML
INJECTION, SOLUTION INTRAVENOUS ONCE
Status: CANCELLED | OUTPATIENT
Start: 2018-06-20 | End: 2018-06-20

## 2018-06-20 RX ADMIN — DIPHENHYDRAMINE HYDROCHLORIDE 25 MG: 25 CAPSULE ORAL at 09:06

## 2018-06-20 RX ADMIN — SODIUM CHLORIDE: 9 INJECTION, SOLUTION INTRAVENOUS at 11:06

## 2018-06-20 RX ADMIN — INFLIXIMAB 250 MG: 100 INJECTION, POWDER, LYOPHILIZED, FOR SOLUTION INTRAVENOUS at 09:06

## 2018-06-20 RX ADMIN — SODIUM CHLORIDE, PRESERVATIVE FREE 5 ML: 5 INJECTION INTRAVENOUS at 09:06

## 2018-06-20 NOTE — NURSING
Remicade infusion complete @ this time.  Pt tolerated infusion without difficulty.  No S+S of adverse reactions noted.  Vital signs stable, afebrile throughout infusion.  IV to left AC d/c'd.  Catheter intact.  Pressure dressing with gauze + coban applied to site.  Pt tolerated procedure well.  Mom + pt instructed to return to clinic in 10 weeks for the next infusion, + to call clinic for any problems or concerns.  They repeated back instructions, + verbalized complete understanding.

## 2018-06-20 NOTE — NURSING
Remicade here from pharmacy.  Good blood return noted to left AC IV, then infusion initiated as ordered.  Vital signs stable, afebrile @ start of infusion. Will monitor pt closely.

## 2018-06-20 NOTE — PLAN OF CARE
Problem: Patient Care Overview  Goal: Plan of Care Review  1.  Pt gets her remicade infusions every 10 weeks.  2.  Premedicate with Benadryl only, no tylenol.  3.  Pt likes to listen to music + cooking.  Outcome: Ongoing (interventions implemented as appropriate)  Pt stated that she has been doing well since last infusion.  No problems reported today.  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  Pt tolerated Remicade infusion without complications today.

## 2018-06-22 LAB
MITOGEN NIL: 9.16 IU/ML
NIL: 0.06 IU/ML
TB ANTIGEN NIL: -0 IU/ML
TB ANTIGEN: 0.06 IU/ML
TB GOLD: NEGATIVE

## 2018-08-28 ENCOUNTER — HOSPITAL ENCOUNTER (OUTPATIENT)
Dept: INFUSION THERAPY | Facility: HOSPITAL | Age: 23
Discharge: HOME | End: 2018-08-28
Attending: PEDIATRICS
Payer: COMMERCIAL

## 2018-08-28 ENCOUNTER — OFFICE VISIT (OUTPATIENT)
Dept: PEDIATRIC GASTROENTEROLOGY | Facility: CLINIC | Age: 23
End: 2018-08-28
Payer: COMMERCIAL

## 2018-08-28 VITALS
HEART RATE: 58 BPM | BODY MASS INDEX: 20.18 KG/M2 | RESPIRATION RATE: 18 BRPM | TEMPERATURE: 98 F | WEIGHT: 109.69 LBS | HEIGHT: 62 IN | DIASTOLIC BLOOD PRESSURE: 69 MMHG | SYSTOLIC BLOOD PRESSURE: 112 MMHG

## 2018-08-28 DIAGNOSIS — K51.00 ULCERATIVE PANCOLITIS WITHOUT COMPLICATION: Primary | ICD-10-CM

## 2018-08-28 DIAGNOSIS — M85.9 LOW BONE DENSITY FOR AGE: ICD-10-CM

## 2018-08-28 DIAGNOSIS — D84.9 IMMUNOSUPPRESSION: ICD-10-CM

## 2018-08-28 DIAGNOSIS — R70.0 ELEVATED SEDIMENTATION RATE: ICD-10-CM

## 2018-08-28 LAB
ALBUMIN SERPL BCP-MCNC: 4.3 G/DL
ALP SERPL-CCNC: 63 U/L
ALT SERPL W/O P-5'-P-CCNC: 13 U/L
ANION GAP SERPL CALC-SCNC: 8 MMOL/L
AST SERPL-CCNC: 23 U/L
BASOPHILS # BLD AUTO: 0.05 K/UL
BASOPHILS NFR BLD: 0.8 %
BILIRUB SERPL-MCNC: 0.4 MG/DL
BUN SERPL-MCNC: 7 MG/DL
CALCIUM SERPL-MCNC: 9.8 MG/DL
CHLORIDE SERPL-SCNC: 104 MMOL/L
CO2 SERPL-SCNC: 26 MMOL/L
CREAT SERPL-MCNC: 0.9 MG/DL
CRP SERPL-MCNC: 0.22 MG/L
DIFFERENTIAL METHOD: ABNORMAL
EOSINOPHIL # BLD AUTO: 0.1 K/UL
EOSINOPHIL NFR BLD: 1.9 %
ERYTHROCYTE [DISTWIDTH] IN BLOOD BY AUTOMATED COUNT: 13.6 %
ERYTHROCYTE [SEDIMENTATION RATE] IN BLOOD BY WESTERGREN METHOD: 8 MM/HR
EST. GFR  (AFRICAN AMERICAN): >60 ML/MIN/1.73 M^2
EST. GFR  (NON AFRICAN AMERICAN): >60 ML/MIN/1.73 M^2
GGT SERPL-CCNC: 20 U/L
GLUCOSE SERPL-MCNC: 76 MG/DL
HCT VFR BLD AUTO: 40.8 %
HGB BLD-MCNC: 13.5 G/DL
IMM GRANULOCYTES # BLD AUTO: 0.01 K/UL
IMM GRANULOCYTES NFR BLD AUTO: 0.2 %
LYMPHOCYTES # BLD AUTO: 2.9 K/UL
LYMPHOCYTES NFR BLD: 46.3 %
MCH RBC QN AUTO: 29.5 PG
MCHC RBC AUTO-ENTMCNC: 33.1 G/DL
MCV RBC AUTO: 89 FL
MONOCYTES # BLD AUTO: 0.6 K/UL
MONOCYTES NFR BLD: 9 %
NEUTROPHILS # BLD AUTO: 2.6 K/UL
NEUTROPHILS NFR BLD: 41.8 %
NRBC BLD-RTO: 0 /100 WBC
PLATELET # BLD AUTO: 381 K/UL
PMV BLD AUTO: 11 FL
POTASSIUM SERPL-SCNC: 4.3 MMOL/L
PROT SERPL-MCNC: 8.6 G/DL
RBC # BLD AUTO: 4.58 M/UL
SODIUM SERPL-SCNC: 138 MMOL/L
WBC # BLD AUTO: 6.22 K/UL

## 2018-08-28 PROCEDURE — 85025 COMPLETE CBC W/AUTO DIFF WBC: CPT

## 2018-08-28 PROCEDURE — 82977 ASSAY OF GGT: CPT

## 2018-08-28 PROCEDURE — 96413 CHEMO IV INFUSION 1 HR: CPT | Mod: PO

## 2018-08-28 PROCEDURE — 86141 C-REACTIVE PROTEIN HS: CPT

## 2018-08-28 PROCEDURE — 80053 COMPREHEN METABOLIC PANEL: CPT

## 2018-08-28 PROCEDURE — 96415 CHEMO IV INFUSION ADDL HR: CPT | Mod: PO

## 2018-08-28 PROCEDURE — 25000003 PHARM REV CODE 250: Mod: PO | Performed by: PEDIATRICS

## 2018-08-28 PROCEDURE — 85652 RBC SED RATE AUTOMATED: CPT

## 2018-08-28 PROCEDURE — 99999 PR PBB SHADOW E&M-EST. PATIENT-LVL II: CPT | Mod: PBBFAC,,, | Performed by: PEDIATRICS

## 2018-08-28 PROCEDURE — 99214 OFFICE O/P EST MOD 30 MIN: CPT | Mod: S$GLB,,, | Performed by: PEDIATRICS

## 2018-08-28 PROCEDURE — A4216 STERILE WATER/SALINE, 10 ML: HCPCS | Mod: PO | Performed by: PEDIATRICS

## 2018-08-28 PROCEDURE — 63600175 PHARM REV CODE 636 W HCPCS: Mod: JG,PO | Performed by: PEDIATRICS

## 2018-08-28 RX ORDER — SODIUM CHLORIDE 0.9 % (FLUSH) 0.9 %
5 SYRINGE (ML) INJECTION
Status: DISCONTINUED | OUTPATIENT
Start: 2018-08-28 | End: 2018-08-29 | Stop reason: HOSPADM

## 2018-08-28 RX ORDER — DIPHENHYDRAMINE HCL 25 MG
25 CAPSULE ORAL ONCE
Status: COMPLETED | OUTPATIENT
Start: 2018-08-28 | End: 2018-08-28

## 2018-08-28 RX ORDER — SODIUM CHLORIDE 9 MG/ML
INJECTION, SOLUTION INTRAVENOUS ONCE
Status: COMPLETED | OUTPATIENT
Start: 2018-08-28 | End: 2018-08-28

## 2018-08-28 RX ORDER — SODIUM CHLORIDE 0.9 % (FLUSH) 0.9 %
5 SYRINGE (ML) INJECTION
Status: CANCELLED | OUTPATIENT
Start: 2018-08-28

## 2018-08-28 RX ORDER — DIPHENHYDRAMINE HCL 25 MG
25 CAPSULE ORAL ONCE
Status: CANCELLED | OUTPATIENT
Start: 2018-08-28 | End: 2018-08-28

## 2018-08-28 RX ORDER — SODIUM CHLORIDE 9 MG/ML
INJECTION, SOLUTION INTRAVENOUS ONCE
Status: CANCELLED | OUTPATIENT
Start: 2018-08-28 | End: 2018-08-28

## 2018-08-28 RX ORDER — ACETAMINOPHEN 325 MG/1
650 TABLET ORAL
Status: CANCELLED | OUTPATIENT
Start: 2018-08-28 | End: 2018-08-28

## 2018-08-28 RX ORDER — ACETAMINOPHEN 325 MG/1
650 TABLET ORAL
Status: DISCONTINUED | OUTPATIENT
Start: 2018-08-28 | End: 2018-08-29 | Stop reason: HOSPADM

## 2018-08-28 RX ADMIN — DIPHENHYDRAMINE HYDROCHLORIDE 25 MG: 25 CAPSULE ORAL at 09:08

## 2018-08-28 RX ADMIN — INFLIXIMAB 250 MG: 100 INJECTION, POWDER, LYOPHILIZED, FOR SOLUTION INTRAVENOUS at 09:08

## 2018-08-28 RX ADMIN — SODIUM CHLORIDE, PRESERVATIVE FREE 5 ML: 5 INJECTION INTRAVENOUS at 09:08

## 2018-08-28 RX ADMIN — SODIUM CHLORIDE: 9 INJECTION, SOLUTION INTRAVENOUS at 11:08

## 2018-08-28 NOTE — Clinical Note
Johanna and Kaylene- 24 yo ulcerative colitis that is on remicade-every 10 weeks(she came to me on this regimen and fought to keep). Last level 6.1 and no Ab's. No symptoms. Last scope in 2014-I have been trying to get her in! Grossly normal-focal architectural distortion per path(can see who read and they always said that!). Gave her your names as far as transition(discussed previously but again today as just turned 23). She inquired about switch to Humira-some for convenience but a lot about cost as she is paying her own medical stuff. I sent in cost/benefits form to humira. I am not a big fan of switching just for those reasons, and discussed reasons including possible loss of remicade if Humira doesn't work. Not sure if cheaper overall, but seems may be. Will see when things happen, just wanted to give you heads up. Nice family and patient! BM

## 2018-08-28 NOTE — PLAN OF CARE
Problem: Patient Care Overview  Goal: Plan of Care Review  1.  Pt gets Remicade every 10 weeks.  2.  Pt takes Benadryl only, no Tylenol prior to Remicade infusion.  3.  Pt likes to draw + listen to music.  Outcome: Ongoing (interventions implemented as appropriate)  Pt stated that she has been doing well.  No problems reported today.  Pt has been busy working full time.  Pt tolerated Remicade infusion without difficulty today.

## 2018-08-28 NOTE — PATIENT INSTRUCTIONS
Remicade today  Preventative care reviewed with patient and family including need for annual flu shot as well as all age appropriate non-live vaccines.  Colonoscopy  Follow up 10 weeks

## 2018-08-28 NOTE — LETTER
August 28, 2018        Zainab Eagle MD  [address]  [place] [address]             [facility]  [address] [facility] [address]  Phone: [phone]   Patient: Fatoumata Soriano   MR Number: [id number]   YOB: 1995   Date of Visit: 8/28/2018       Dear Dr. Eagle:    Thank you for referring Fatoumata Soriano to me for evaluation. Attached you will find relevant portions of my assessment and plan of care.    If you have questions, please do not hesitate to call me. I look forward to following Fatoumata Soriano along with you.    Sincerely,      Johnnie Hicks MD            CC  No Recipients    Enclosure

## 2018-08-28 NOTE — PROGRESS NOTES
Subjective:       Patient ID: Fatoumata Soriano is a 23 y.o. female.    Chief Complaint: No chief complaint on file.    HPI  Review of Systems   Constitutional: Negative for activity change, appetite change, fatigue, fever and unexpected weight change.   HENT: Negative for congestion, hearing loss, mouth sores, rhinorrhea, sneezing, sore throat and trouble swallowing.    Eyes: Negative for photophobia and visual disturbance.   Respiratory: Negative for apnea, cough, choking, chest tightness, shortness of breath, wheezing and stridor.    Cardiovascular: Negative for chest pain.   Gastrointestinal: Negative for blood in stool, constipation and diarrhea.   Endocrine: Negative for cold intolerance and heat intolerance.   Genitourinary: Negative for decreased urine volume, dysuria and menstrual problem.   Musculoskeletal: Negative for arthralgias, back pain, joint swelling, myalgias, neck pain and neck stiffness.   Skin: Negative for pallor and rash.   Allergic/Immunologic: Negative for food allergies.   Neurological: Negative for seizures, weakness and headaches.   Hematological: Negative for adenopathy. Does not bruise/bleed easily.   Psychiatric/Behavioral: Negative for agitation and sleep disturbance. The patient is not nervous/anxious and is not hyperactive.        Objective:      Physical Exam    Assessment:       1. Ulcerative pancolitis without complication    2. Low bone density for age    3. Immunosuppression        Plan:       CHIEF COMPLAINT: Patient is here for follow up of ulcerative colitis.    HISTORY OF PRESENT ILLNESS:  Patient follows up today for ongoing care above symptoms and for her Remicade infusion.  She reports no problems.  There is no pain diarrhea blood in the stool.  Bowel movements are normal in daily.  There are no fevers.  She has not scheduled her follow-up colonoscopy.  Patient inquired about different medications for her ulcerative colitis given the expense and her pain for her medical  care now.  She inquired about Humira and finding out from a benefit standpoint the cost of the medication.  There are no fevers.  There are no rashes.  Menstrual cycles are normal. Patient has tolerated the Remicade without difficulty    STUDIES REVIEWED:  Last colonoscopy in 2014 was normal grossly.  They reported some focal architectural distortion microscopically.  Terminal ileum was normal.    MEDICATIONS/ALLERGIES: The patient's MedCard has been reviewed and/or reconciled.    PMH, SH, FH, all reviewed and no changes except as noted.    PHYSICAL EXAMINATION:   Remainder of vital signs unremarkable, please refer to vital signs sheet.  General: Alert, WN, WH, NAD  Chest: Clear to auscultation bilaterally.No increased work of breathing   Heart: Regular, rate and rhythm without murmur  Abdomen: Soft, non tender, non distended, no hepatosplenomegaly, no stool masses, no rebound or guarding.  Extremities: Symmetric, well perfused and no edema.  Patient was admitted to the infusion center and an IV was started. Patient was premedicated with tylenol and benadryl. Patient was then infused with 250 mg of Remicade per protocol. Patient tolerated the infusion well, the IV was removed, and patient was discharged.    IMPRESSION/PLAN:  Patient follows up today for ongoing care of above symptoms.  Clinically she is doing well. She is tolerating the Remicade without difficulty. I discussed hesitancy switching to another medication min the current medication is working.  There is no guarantee the return to Remicade would be effective if loss RO response.  Her last level was 6 which is on the low side but adequate and no antibodies.  She does need to have a follow-up colonoscopy for surveillance of mucosal healing as well as dysplasia.  She was diagnosed about 11 years ago.  I did discuss transition of care as well. I provided names of my colleagues here.  I discussed the risk benefits and alternatives of the procedure including  sedation by anesthesia and risk of perforating or bruising the organs of the GI tract with the caretaker who verbalized understanding of the plan and risk associated and agreed to proceed. Consent will be obtained at time of endoscopy.  Patient needs health maintenance items listed below as well.    Patient Instructions   Remicade today  Preventative care reviewed with patient and family including need for annual flu shot as well as all age appropriate non-live vaccines.  Colonoscopy  Follow up 10 weeks        This was discussed at length with parents who expressed understanding and agreement. Questions were answered.  This note has been dictated using voice recognition software.

## 2018-08-29 ENCOUNTER — TELEPHONE (OUTPATIENT)
Dept: PEDIATRIC GASTROENTEROLOGY | Facility: CLINIC | Age: 23
End: 2018-08-29

## 2018-10-19 ENCOUNTER — TELEPHONE (OUTPATIENT)
Dept: PEDIATRIC GASTROENTEROLOGY | Facility: CLINIC | Age: 23
End: 2018-10-19

## 2018-10-19 NOTE — TELEPHONE ENCOUNTER
Called and spoke with pt, provided recommendations.  Pt is requesting a signed letter from MD stating it is okay for her to use spironolactone. Please advise.     Attn: Dr. Shaina Guidry fax [phone]

## 2018-10-19 NOTE — LETTER
October 22, 2018    Fatoumata Soriano  [address] [address] [facility] [address]             [facility]  [address]  [facility] [address]  Phone: [phone] To Whom it May Concern:    It is ok from a GI standpoint for Fatoumata to take Spironolactone.      If you have any questions or concerns, please don't hesitate to call.    Sincerely,        Johnnie Hicks MD

## 2018-10-19 NOTE — TELEPHONE ENCOUNTER
Called pt.  She recently visited dermatologist, who instructed pt that her best options for controlling her chronic acne are either Accutane or Spironolactone.  She is calling to see if either medication is fine for her UC.  Please advise.

## 2018-10-19 NOTE — TELEPHONE ENCOUNTER
----- Message from Kandace Rob sent at 10/19/2018  1:45 PM CDT -----  Contact: Pt Fatoumata [phone]  Needs Advice    Reason for call:Pt have questing for Dr Hicks   Communication Preference:Fatoumata requesting a call back   Additional Information:Pt states she will wait for the Nurse to call her.

## 2018-11-05 ENCOUNTER — HOSPITAL ENCOUNTER (OUTPATIENT)
Dept: INFUSION THERAPY | Facility: HOSPITAL | Age: 23
Discharge: HOME | End: 2018-11-05
Attending: PEDIATRICS
Payer: COMMERCIAL

## 2018-11-05 ENCOUNTER — HOSPITAL ENCOUNTER (OUTPATIENT)
Dept: RADIOLOGY | Facility: CLINIC | Age: 23
Discharge: HOME | End: 2018-11-05
Attending: PEDIATRICS
Payer: COMMERCIAL

## 2018-11-05 VITALS
SYSTOLIC BLOOD PRESSURE: 114 MMHG | TEMPERATURE: 98 F | BODY MASS INDEX: 19.15 KG/M2 | DIASTOLIC BLOOD PRESSURE: 65 MMHG | HEIGHT: 62 IN | RESPIRATION RATE: 18 BRPM | WEIGHT: 104.06 LBS | HEART RATE: 84 BPM

## 2018-11-05 DIAGNOSIS — R70.0 ELEVATED SEDIMENTATION RATE: ICD-10-CM

## 2018-11-05 DIAGNOSIS — D84.9 IMMUNOSUPPRESSION: ICD-10-CM

## 2018-11-05 DIAGNOSIS — K51.00 ULCERATIVE PANCOLITIS WITHOUT COMPLICATION: Primary | ICD-10-CM

## 2018-11-05 DIAGNOSIS — M85.9 LOW BONE DENSITY FOR AGE: ICD-10-CM

## 2018-11-05 DIAGNOSIS — K51.00 ULCERATIVE PANCOLITIS WITHOUT COMPLICATION: ICD-10-CM

## 2018-11-05 LAB
ALBUMIN SERPL BCP-MCNC: 4.5 G/DL
ALP SERPL-CCNC: 48 U/L
ALT SERPL W/O P-5'-P-CCNC: 10 U/L
ANION GAP SERPL CALC-SCNC: 10 MMOL/L
AST SERPL-CCNC: 18 U/L
BASOPHILS # BLD AUTO: 0.06 K/UL
BASOPHILS NFR BLD: 1 %
BILIRUB SERPL-MCNC: 0.5 MG/DL
BUN SERPL-MCNC: 8 MG/DL
CALCIUM SERPL-MCNC: 9.9 MG/DL
CHLORIDE SERPL-SCNC: 105 MMOL/L
CO2 SERPL-SCNC: 24 MMOL/L
CREAT SERPL-MCNC: 0.8 MG/DL
CRP SERPL-MCNC: 0.28 MG/L
DIFFERENTIAL METHOD: NORMAL
EOSINOPHIL # BLD AUTO: 0.1 K/UL
EOSINOPHIL NFR BLD: 1 %
ERYTHROCYTE [DISTWIDTH] IN BLOOD BY AUTOMATED COUNT: 12.7 %
ERYTHROCYTE [SEDIMENTATION RATE] IN BLOOD BY WESTERGREN METHOD: 4 MM/HR
EST. GFR  (AFRICAN AMERICAN): >60 ML/MIN/1.73 M^2
EST. GFR  (NON AFRICAN AMERICAN): >60 ML/MIN/1.73 M^2
GGT SERPL-CCNC: 23 U/L
GLUCOSE SERPL-MCNC: 81 MG/DL
HCT VFR BLD AUTO: 40.1 %
HGB BLD-MCNC: 13.1 G/DL
IMM GRANULOCYTES # BLD AUTO: 0.01 K/UL
IMM GRANULOCYTES NFR BLD AUTO: 0.2 %
LYMPHOCYTES # BLD AUTO: 2.4 K/UL
LYMPHOCYTES NFR BLD: 37.8 %
MCH RBC QN AUTO: 30.1 PG
MCHC RBC AUTO-ENTMCNC: 32.7 G/DL
MCV RBC AUTO: 92 FL
MONOCYTES # BLD AUTO: 0.5 K/UL
MONOCYTES NFR BLD: 7.9 %
NEUTROPHILS # BLD AUTO: 3.3 K/UL
NEUTROPHILS NFR BLD: 52.1 %
NRBC BLD-RTO: 0 /100 WBC
PLATELET # BLD AUTO: 320 K/UL
PMV BLD AUTO: 10.8 FL
POTASSIUM SERPL-SCNC: 3.9 MMOL/L
PROT SERPL-MCNC: 8.4 G/DL
RBC # BLD AUTO: 4.35 M/UL
SODIUM SERPL-SCNC: 139 MMOL/L
WBC # BLD AUTO: 6.24 K/UL

## 2018-11-05 PROCEDURE — 96415 CHEMO IV INFUSION ADDL HR: CPT | Mod: PO

## 2018-11-05 PROCEDURE — 85652 RBC SED RATE AUTOMATED: CPT

## 2018-11-05 PROCEDURE — 82977 ASSAY OF GGT: CPT

## 2018-11-05 PROCEDURE — 25000003 PHARM REV CODE 250: Mod: PO | Performed by: PEDIATRICS

## 2018-11-05 PROCEDURE — 77080 DXA BONE DENSITY AXIAL: CPT | Mod: TC

## 2018-11-05 PROCEDURE — 85025 COMPLETE CBC W/AUTO DIFF WBC: CPT

## 2018-11-05 PROCEDURE — 96413 CHEMO IV INFUSION 1 HR: CPT | Mod: PO

## 2018-11-05 PROCEDURE — 86141 C-REACTIVE PROTEIN HS: CPT

## 2018-11-05 PROCEDURE — A4216 STERILE WATER/SALINE, 10 ML: HCPCS | Mod: PO | Performed by: PEDIATRICS

## 2018-11-05 PROCEDURE — 63600175 PHARM REV CODE 636 W HCPCS: Mod: JW,JG,PO | Performed by: PEDIATRICS

## 2018-11-05 PROCEDURE — 77080 DXA BONE DENSITY AXIAL: CPT | Mod: 26,,, | Performed by: INTERNAL MEDICINE

## 2018-11-05 PROCEDURE — 80053 COMPREHEN METABOLIC PANEL: CPT

## 2018-11-05 RX ORDER — BUPROPION HYDROCHLORIDE 150 MG/1
150 TABLET, EXTENDED RELEASE ORAL 2 TIMES DAILY
COMMUNITY
End: 2019-03-29

## 2018-11-05 RX ORDER — ESTRADIOL VALERATE AND ESTRADIOL VALERATE/DIENOGEST 3-2-1(28)
KIT ORAL
COMMUNITY
Start: 2018-10-19

## 2018-11-05 RX ORDER — SODIUM CHLORIDE 9 MG/ML
INJECTION, SOLUTION INTRAVENOUS ONCE
Status: COMPLETED | OUTPATIENT
Start: 2018-11-05 | End: 2018-11-05

## 2018-11-05 RX ORDER — DIPHENHYDRAMINE HCL 25 MG
25 CAPSULE ORAL ONCE
Status: CANCELLED | OUTPATIENT
Start: 2018-11-05 | End: 2018-11-05

## 2018-11-05 RX ORDER — ACETAMINOPHEN 325 MG/1
650 TABLET ORAL
Status: CANCELLED | OUTPATIENT
Start: 2018-11-05 | End: 2018-11-05

## 2018-11-05 RX ORDER — ADAPALENE AND BENZOYL PEROXIDE 3; 25 MG/G; MG/G
GEL TOPICAL
Refills: 0 | COMMUNITY
Start: 2018-10-11

## 2018-11-05 RX ORDER — CLONAZEPAM 0.5 MG/1
TABLET ORAL
Refills: 0 | COMMUNITY
Start: 2018-08-09

## 2018-11-05 RX ORDER — DIPHENHYDRAMINE HCL 25 MG
25 CAPSULE ORAL ONCE
Status: COMPLETED | OUTPATIENT
Start: 2018-11-05 | End: 2018-11-05

## 2018-11-05 RX ORDER — SODIUM CHLORIDE 0.9 % (FLUSH) 0.9 %
5 SYRINGE (ML) INJECTION
Status: DISCONTINUED | OUTPATIENT
Start: 2018-11-05 | End: 2018-11-06 | Stop reason: HOSPADM

## 2018-11-05 RX ORDER — SODIUM CHLORIDE 9 MG/ML
INJECTION, SOLUTION INTRAVENOUS ONCE
Status: CANCELLED | OUTPATIENT
Start: 2018-11-05 | End: 2018-11-05

## 2018-11-05 RX ORDER — ACETAMINOPHEN 325 MG/1
650 TABLET ORAL
Status: DISCONTINUED | OUTPATIENT
Start: 2018-11-05 | End: 2018-11-06 | Stop reason: HOSPADM

## 2018-11-05 RX ORDER — SODIUM CHLORIDE 0.9 % (FLUSH) 0.9 %
5 SYRINGE (ML) INJECTION
Status: CANCELLED | OUTPATIENT
Start: 2018-11-05

## 2018-11-05 RX ADMIN — INFLIXIMAB 250 MG: 100 INJECTION, POWDER, LYOPHILIZED, FOR SOLUTION INTRAVENOUS at 10:11

## 2018-11-05 RX ADMIN — DIPHENHYDRAMINE HYDROCHLORIDE 25 MG: 25 CAPSULE ORAL at 09:11

## 2018-11-05 RX ADMIN — SODIUM CHLORIDE: 9 INJECTION, SOLUTION INTRAVENOUS at 12:11

## 2018-11-05 RX ADMIN — SODIUM CHLORIDE, PRESERVATIVE FREE 5 ML: 5 INJECTION INTRAVENOUS at 09:11

## 2018-11-05 NOTE — PLAN OF CARE
Problem: Patient Care Overview  Goal: Plan of Care Review  1.  Pt gets Remicade every 10 weeks.  2.  Pt takes Benadryl only, no Tylenol prior to Remicade infusion.  3.  Pt likes to draw + listen to music.   Outcome: Ongoing (interventions implemented as appropriate)  Pt reports no issues since last remicade infusion, states she has been doing very well. Pt tolerated remicade infusion well with no issues, conversed with mom and sister throughout infusion today.

## 2018-11-05 NOTE — NURSING
Remicade infusion complete at this time. Pt tolerated infusion without difficulty. No S+S of adverse reactions noted. Vital signs stable, afebrile throughout infusion. IV to right AC discontinued. Catheter intact. Pressure dressing with gauze and coban applied to site. Pt tolerated procedure well. Mom and pt instructed to return to clinic in 10 weeks for next infusion and to call clinic for any problems or concerns.They repeated back instructions and verbalized complete understanding.

## 2019-01-10 ENCOUNTER — TELEPHONE (OUTPATIENT)
Dept: PEDIATRIC GASTROENTEROLOGY | Facility: CLINIC | Age: 24
End: 2019-01-10

## 2019-01-10 NOTE — TELEPHONE ENCOUNTER
----- Message from Gail Martinez sent at 1/10/2019 10:01 AM CST -----  Contact: Nohelia/Alyssa [phone]  Reason for call: Infusion treatments        Communication Preference: Nohelia/Alyssa [phone]    Additional Information: Nohelia is requesting a call back in regards to patient's infusion treatments.

## 2019-01-18 ENCOUNTER — HOSPITAL ENCOUNTER (OUTPATIENT)
Dept: INFUSION THERAPY | Facility: HOSPITAL | Age: 24
Discharge: HOME | End: 2019-01-18
Attending: PEDIATRICS
Payer: COMMERCIAL

## 2019-01-18 VITALS
RESPIRATION RATE: 18 BRPM | SYSTOLIC BLOOD PRESSURE: 101 MMHG | DIASTOLIC BLOOD PRESSURE: 65 MMHG | WEIGHT: 102.94 LBS | BODY MASS INDEX: 18.94 KG/M2 | HEART RATE: 88 BPM | TEMPERATURE: 98 F | HEIGHT: 62 IN

## 2019-01-18 DIAGNOSIS — R70.0 ELEVATED SEDIMENTATION RATE: ICD-10-CM

## 2019-01-18 DIAGNOSIS — K51.00 ULCERATIVE PANCOLITIS WITHOUT COMPLICATION: Primary | ICD-10-CM

## 2019-01-18 LAB
ALBUMIN SERPL BCP-MCNC: 4.3 G/DL
ALP SERPL-CCNC: 62 U/L
ALT SERPL W/O P-5'-P-CCNC: 10 U/L
ANION GAP SERPL CALC-SCNC: 10 MMOL/L
AST SERPL-CCNC: 20 U/L
BASOPHILS # BLD AUTO: 0.07 K/UL
BASOPHILS NFR BLD: 0.8 %
BILIRUB SERPL-MCNC: 0.4 MG/DL
BUN SERPL-MCNC: 10 MG/DL
CALCIUM SERPL-MCNC: 9.9 MG/DL
CHLORIDE SERPL-SCNC: 104 MMOL/L
CO2 SERPL-SCNC: 27 MMOL/L
CREAT SERPL-MCNC: 0.8 MG/DL
CRP SERPL-MCNC: 0.13 MG/L
DIFFERENTIAL METHOD: ABNORMAL
EOSINOPHIL # BLD AUTO: 0.1 K/UL
EOSINOPHIL NFR BLD: 1.5 %
ERYTHROCYTE [DISTWIDTH] IN BLOOD BY AUTOMATED COUNT: 12.4 %
ERYTHROCYTE [SEDIMENTATION RATE] IN BLOOD BY WESTERGREN METHOD: 5 MM/HR
EST. GFR  (AFRICAN AMERICAN): >60 ML/MIN/1.73 M^2
EST. GFR  (NON AFRICAN AMERICAN): >60 ML/MIN/1.73 M^2
GGT SERPL-CCNC: 22 U/L
GLUCOSE SERPL-MCNC: 78 MG/DL
HCT VFR BLD AUTO: 41.3 %
HGB BLD-MCNC: 13.4 G/DL
IMM GRANULOCYTES # BLD AUTO: 0.01 K/UL
IMM GRANULOCYTES NFR BLD AUTO: 0.1 %
LYMPHOCYTES # BLD AUTO: 4 K/UL
LYMPHOCYTES NFR BLD: 47.7 %
MCH RBC QN AUTO: 30.9 PG
MCHC RBC AUTO-ENTMCNC: 32.4 G/DL
MCV RBC AUTO: 95 FL
MONOCYTES # BLD AUTO: 0.6 K/UL
MONOCYTES NFR BLD: 7 %
NEUTROPHILS # BLD AUTO: 3.6 K/UL
NEUTROPHILS NFR BLD: 42.9 %
NRBC BLD-RTO: 0 /100 WBC
PLATELET # BLD AUTO: 364 K/UL
PMV BLD AUTO: 10.5 FL
POTASSIUM SERPL-SCNC: 4.3 MMOL/L
PROT SERPL-MCNC: 8.6 G/DL
RBC # BLD AUTO: 4.34 M/UL
SODIUM SERPL-SCNC: 141 MMOL/L
WBC # BLD AUTO: 8.4 K/UL

## 2019-01-18 PROCEDURE — 25000003 PHARM REV CODE 250: Mod: PO | Performed by: PEDIATRICS

## 2019-01-18 PROCEDURE — 82977 ASSAY OF GGT: CPT

## 2019-01-18 PROCEDURE — 96415 CHEMO IV INFUSION ADDL HR: CPT | Mod: PO

## 2019-01-18 PROCEDURE — 85025 COMPLETE CBC W/AUTO DIFF WBC: CPT

## 2019-01-18 PROCEDURE — 86141 C-REACTIVE PROTEIN HS: CPT

## 2019-01-18 PROCEDURE — 85652 RBC SED RATE AUTOMATED: CPT

## 2019-01-18 PROCEDURE — A4216 STERILE WATER/SALINE, 10 ML: HCPCS | Mod: PO | Performed by: PEDIATRICS

## 2019-01-18 PROCEDURE — 80053 COMPREHEN METABOLIC PANEL: CPT

## 2019-01-18 PROCEDURE — 96413 CHEMO IV INFUSION 1 HR: CPT | Mod: PO

## 2019-01-18 PROCEDURE — 63600175 PHARM REV CODE 636 W HCPCS: Mod: JG,PO | Performed by: PEDIATRICS

## 2019-01-18 RX ORDER — DIPHENHYDRAMINE HCL 25 MG
25 CAPSULE ORAL ONCE
Status: CANCELLED | OUTPATIENT
Start: 2019-01-18 | End: 2019-01-18

## 2019-01-18 RX ORDER — SODIUM CHLORIDE 0.9 % (FLUSH) 0.9 %
5 SYRINGE (ML) INJECTION
Status: CANCELLED | OUTPATIENT
Start: 2019-01-18

## 2019-01-18 RX ORDER — SODIUM CHLORIDE 9 MG/ML
INJECTION, SOLUTION INTRAVENOUS ONCE
Status: CANCELLED | OUTPATIENT
Start: 2019-01-18 | End: 2019-01-18

## 2019-01-18 RX ORDER — SODIUM CHLORIDE 0.9 % (FLUSH) 0.9 %
5 SYRINGE (ML) INJECTION
Status: DISCONTINUED | OUTPATIENT
Start: 2019-01-18 | End: 2019-01-19 | Stop reason: HOSPADM

## 2019-01-18 RX ORDER — SODIUM CHLORIDE 9 MG/ML
INJECTION, SOLUTION INTRAVENOUS ONCE
Status: COMPLETED | OUTPATIENT
Start: 2019-01-18 | End: 2019-01-18

## 2019-01-18 RX ORDER — DIPHENHYDRAMINE HCL 25 MG
25 CAPSULE ORAL ONCE
Status: COMPLETED | OUTPATIENT
Start: 2019-01-18 | End: 2019-01-18

## 2019-01-18 RX ORDER — ACETAMINOPHEN 325 MG/1
650 TABLET ORAL
Status: CANCELLED | OUTPATIENT
Start: 2019-01-18 | End: 2019-01-18

## 2019-01-18 RX ORDER — ACETAMINOPHEN 325 MG/1
650 TABLET ORAL
Status: DISCONTINUED | OUTPATIENT
Start: 2019-01-18 | End: 2019-01-19 | Stop reason: HOSPADM

## 2019-01-18 RX ADMIN — SODIUM CHLORIDE: 9 INJECTION, SOLUTION INTRAVENOUS at 04:01

## 2019-01-18 RX ADMIN — DIPHENHYDRAMINE HYDROCHLORIDE 25 MG: 25 CAPSULE ORAL at 02:01

## 2019-01-18 RX ADMIN — Medication 5 ML: at 02:01

## 2019-01-18 RX ADMIN — INFLIXIMAB 250 MG: 100 INJECTION, POWDER, LYOPHILIZED, FOR SOLUTION INTRAVENOUS at 02:01

## 2019-01-18 NOTE — PLAN OF CARE
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 Pt tolerated infusion well with no issues. She was looking at her phone for most of infusion.

## 2019-01-18 NOTE — NURSING
Remicade infusion complete at this time. Pt tolerated infusion without difficulty. No S+S of adverse reactions noted. Vital signs stable, afebrile throughout infusion. IV to left hand discontinued. Catheter intact. Pressure dressing with gauze and coban applied to site. Pt tolerated procedure well. Pt instructed to check with insurance re ability to continue getting infused here, call with update, infusion appt scheduled in 10 weeks for next infusion in case able to get auth to continue infusions at [facility], call clinic for any problems or concerns.Pt repeated back instructions and verbalized complete understanding.

## 2019-03-04 ENCOUNTER — TELEPHONE (OUTPATIENT)
Dept: PEDIATRIC GASTROENTEROLOGY | Facility: CLINIC | Age: 24
End: 2019-03-04

## 2019-03-04 NOTE — TELEPHONE ENCOUNTER
MD Dora Vyas, RN; Shandra ANGEL Wei             I haven't authorized any away infusions. She has been stable with them and likely will be cheaper for her. She will likely do fine with this.  Has the patient looked in to transitioning care yet. We also need to repeat her colonoscopy soon(or new provider can). BM

## 2019-03-18 ENCOUNTER — LAB VISIT (OUTPATIENT)
Dept: LAB | Facility: HOSPITAL | Age: 24
End: 2019-03-18
Attending: INTERNAL MEDICINE
Payer: COMMERCIAL

## 2019-03-18 DIAGNOSIS — K51.90 ULCERATIVE COLITIS: Primary | ICD-10-CM

## 2019-03-18 LAB
25(OH)D3+25(OH)D2 SERPL-MCNC: 23 NG/ML
ALBUMIN SERPL BCP-MCNC: 4.5 G/DL
ALP SERPL-CCNC: 52 U/L
ALT SERPL W/O P-5'-P-CCNC: 14 U/L
ANION GAP SERPL CALC-SCNC: 9 MMOL/L
AST SERPL-CCNC: 20 U/L
BILIRUB SERPL-MCNC: 0.8 MG/DL
BUN SERPL-MCNC: 8 MG/DL
CALCIUM SERPL-MCNC: 10.3 MG/DL
CHLORIDE SERPL-SCNC: 105 MMOL/L
CO2 SERPL-SCNC: 26 MMOL/L
CORTIS SERPL-MCNC: 19.3 UG/DL
CREAT SERPL-MCNC: 0.8 MG/DL
EST. GFR  (AFRICAN AMERICAN): >60 ML/MIN/1.73 M^2
EST. GFR  (NON AFRICAN AMERICAN): >60 ML/MIN/1.73 M^2
GLUCOSE SERPL-MCNC: 93 MG/DL
POTASSIUM SERPL-SCNC: 4 MMOL/L
PROT SERPL-MCNC: 8.4 G/DL
SODIUM SERPL-SCNC: 140 MMOL/L
T3FREE SERPL-MCNC: 2.4 PG/ML
T4 FREE SERPL-MCNC: 1.14 NG/DL
TESTOST SERPL-MCNC: 22 NG/DL
THYROPEROXIDASE IGG SERPL-ACNC: 10.3 IU/ML
TSH SERPL DL<=0.005 MIU/L-ACNC: 1.12 UIU/ML

## 2019-03-18 PROCEDURE — 84165 PROTEIN E-PHORESIS SERUM: CPT | Mod: 26,,, | Performed by: PATHOLOGY

## 2019-03-18 PROCEDURE — 84443 ASSAY THYROID STIM HORMONE: CPT

## 2019-03-18 PROCEDURE — 84165 PATHOLOGIST INTERPRETATION SPE: ICD-10-PCS | Mod: 26,,, | Performed by: PATHOLOGY

## 2019-03-18 PROCEDURE — 83516 IMMUNOASSAY NONANTIBODY: CPT

## 2019-03-18 PROCEDURE — 84445 ASSAY OF TSI GLOBULIN: CPT

## 2019-03-18 PROCEDURE — 84403 ASSAY OF TOTAL TESTOSTERONE: CPT

## 2019-03-18 PROCEDURE — 36415 COLL VENOUS BLD VENIPUNCTURE: CPT

## 2019-03-18 PROCEDURE — 86376 MICROSOMAL ANTIBODY EACH: CPT

## 2019-03-18 PROCEDURE — 82533 TOTAL CORTISOL: CPT

## 2019-03-18 PROCEDURE — 84481 FREE ASSAY (FT-3): CPT

## 2019-03-18 PROCEDURE — 80053 COMPREHEN METABOLIC PANEL: CPT

## 2019-03-18 PROCEDURE — 84439 ASSAY OF FREE THYROXINE: CPT

## 2019-03-18 PROCEDURE — 84165 PROTEIN E-PHORESIS SERUM: CPT

## 2019-03-18 PROCEDURE — 82024 ASSAY OF ACTH: CPT

## 2019-03-18 PROCEDURE — 82306 VITAMIN D 25 HYDROXY: CPT

## 2019-03-19 LAB
ACTH PLAS-MCNC: 9 PG/ML
ALBUMIN SERPL ELPH-MCNC: 4.45 G/DL
ALPHA1 GLOB SERPL ELPH-MCNC: 0.27 G/DL
ALPHA2 GLOB SERPL ELPH-MCNC: 0.9 G/DL
B-GLOBULIN SERPL ELPH-MCNC: 0.94 G/DL
GAMMA GLOB SERPL ELPH-MCNC: 1.44 G/DL
PATHOLOGIST INTERPRETATION SPE: NORMAL
PROT SERPL-MCNC: 8 G/DL
TSI SER-ACNC: <0.1 IU/L
TTG IGA SER-ACNC: 7 UNITS

## 2019-03-27 ENCOUNTER — TELEPHONE (OUTPATIENT)
Dept: INFUSION THERAPY | Facility: HOSPITAL | Age: 24
End: 2019-03-27

## 2019-03-27 NOTE — TELEPHONE ENCOUNTER
Remicade infusion scheduled on this upcoming  Fri., 3/29/19. Remicade approved by insurance for 1 last infusion. Attempted to call pt, no answer. Voicemail had not been set up. Left message on [phone] for pt to call back to clinic for information.

## 2019-03-27 NOTE — TELEPHONE ENCOUNTER
Pt returned call back to clinic. Pt informed that she was approved for 1 additional Remicade infusion by her insurance. Pt stated that her insurance is changing 4/1/19. Pt instructed to bring new insurance card into clinic. She verbalized complete understanding.

## 2019-03-29 ENCOUNTER — HOSPITAL ENCOUNTER (OUTPATIENT)
Dept: INFUSION THERAPY | Facility: HOSPITAL | Age: 24
Discharge: HOME | End: 2019-03-29
Attending: PEDIATRICS
Payer: COMMERCIAL

## 2019-03-29 VITALS
TEMPERATURE: 98 F | BODY MASS INDEX: 19.29 KG/M2 | SYSTOLIC BLOOD PRESSURE: 110 MMHG | RESPIRATION RATE: 18 BRPM | WEIGHT: 104.81 LBS | HEART RATE: 82 BPM | DIASTOLIC BLOOD PRESSURE: 71 MMHG | HEIGHT: 62 IN

## 2019-03-29 DIAGNOSIS — R70.0 ELEVATED SEDIMENTATION RATE: ICD-10-CM

## 2019-03-29 DIAGNOSIS — K51.00 ULCERATIVE PANCOLITIS WITHOUT COMPLICATION: Primary | ICD-10-CM

## 2019-03-29 LAB
ALBUMIN SERPL BCP-MCNC: 4.3 G/DL (ref 3.5–5.2)
ALP SERPL-CCNC: 57 U/L (ref 55–135)
ALT SERPL W/O P-5'-P-CCNC: 11 U/L (ref 10–44)
ANION GAP SERPL CALC-SCNC: 11 MMOL/L (ref 8–16)
AST SERPL-CCNC: 17 U/L (ref 10–40)
BASOPHILS # BLD AUTO: 0.06 K/UL (ref 0–0.2)
BASOPHILS NFR BLD: 0.7 % (ref 0–1.9)
BILIRUB SERPL-MCNC: 0.4 MG/DL (ref 0.1–1)
BUN SERPL-MCNC: 11 MG/DL (ref 6–20)
CALCIUM SERPL-MCNC: 9.8 MG/DL (ref 8.7–10.5)
CHLORIDE SERPL-SCNC: 103 MMOL/L (ref 95–110)
CO2 SERPL-SCNC: 26 MMOL/L (ref 23–29)
CREAT SERPL-MCNC: 0.7 MG/DL (ref 0.5–1.4)
CRP SERPL-MCNC: 0.2 MG/L (ref 0–3.19)
DIFFERENTIAL METHOD: ABNORMAL
EOSINOPHIL # BLD AUTO: 0.1 K/UL (ref 0–0.5)
EOSINOPHIL NFR BLD: 1.4 % (ref 0–8)
ERYTHROCYTE [DISTWIDTH] IN BLOOD BY AUTOMATED COUNT: 12.9 % (ref 11.5–14.5)
ERYTHROCYTE [SEDIMENTATION RATE] IN BLOOD BY WESTERGREN METHOD: 12 MM/HR (ref 0–36)
EST. GFR  (AFRICAN AMERICAN): >60 ML/MIN/1.73 M^2
EST. GFR  (NON AFRICAN AMERICAN): >60 ML/MIN/1.73 M^2
GGT SERPL-CCNC: 21 U/L (ref 8–55)
GLUCOSE SERPL-MCNC: 89 MG/DL (ref 70–110)
HCT VFR BLD AUTO: 39.2 % (ref 37–48.5)
HGB BLD-MCNC: 12.7 G/DL (ref 12–16)
IMM GRANULOCYTES # BLD AUTO: 0.02 K/UL (ref 0–0.04)
IMM GRANULOCYTES NFR BLD AUTO: 0.2 % (ref 0–0.5)
LYMPHOCYTES # BLD AUTO: 3.8 K/UL (ref 1–4.8)
LYMPHOCYTES NFR BLD: 43.1 % (ref 18–48)
MCH RBC QN AUTO: 29.7 PG (ref 27–31)
MCHC RBC AUTO-ENTMCNC: 32.4 G/DL (ref 32–36)
MCV RBC AUTO: 92 FL (ref 82–98)
MONOCYTES # BLD AUTO: 0.8 K/UL (ref 0.3–1)
MONOCYTES NFR BLD: 9 % (ref 4–15)
NEUTROPHILS # BLD AUTO: 4 K/UL (ref 1.8–7.7)
NEUTROPHILS NFR BLD: 45.6 % (ref 38–73)
NRBC BLD-RTO: 0 /100 WBC
PLATELET # BLD AUTO: 353 K/UL (ref 150–350)
PMV BLD AUTO: 10.6 FL (ref 9.2–12.9)
POTASSIUM SERPL-SCNC: 3.8 MMOL/L (ref 3.5–5.1)
PROT SERPL-MCNC: 8.1 G/DL (ref 6–8.4)
RBC # BLD AUTO: 4.27 M/UL (ref 4–5.4)
SODIUM SERPL-SCNC: 140 MMOL/L (ref 136–145)
WBC # BLD AUTO: 8.74 K/UL (ref 3.9–12.7)

## 2019-03-29 PROCEDURE — A4216 STERILE WATER/SALINE, 10 ML: HCPCS | Mod: PO | Performed by: PEDIATRICS

## 2019-03-29 PROCEDURE — 96415 CHEMO IV INFUSION ADDL HR: CPT | Mod: PO

## 2019-03-29 PROCEDURE — 63600175 PHARM REV CODE 636 W HCPCS: Mod: JG,PO | Performed by: PEDIATRICS

## 2019-03-29 PROCEDURE — 36415 COLL VENOUS BLD VENIPUNCTURE: CPT

## 2019-03-29 PROCEDURE — 86141 C-REACTIVE PROTEIN HS: CPT

## 2019-03-29 PROCEDURE — 82977 ASSAY OF GGT: CPT

## 2019-03-29 PROCEDURE — 85652 RBC SED RATE AUTOMATED: CPT

## 2019-03-29 PROCEDURE — 25000003 PHARM REV CODE 250: Mod: PO | Performed by: PEDIATRICS

## 2019-03-29 PROCEDURE — 85025 COMPLETE CBC W/AUTO DIFF WBC: CPT

## 2019-03-29 PROCEDURE — 96413 CHEMO IV INFUSION 1 HR: CPT | Mod: PO

## 2019-03-29 PROCEDURE — 80053 COMPREHEN METABOLIC PANEL: CPT

## 2019-03-29 PROCEDURE — 87340 HEPATITIS B SURFACE AG IA: CPT

## 2019-03-29 PROCEDURE — 86704 HEP B CORE ANTIBODY TOTAL: CPT

## 2019-03-29 RX ORDER — SODIUM CHLORIDE 9 MG/ML
INJECTION, SOLUTION INTRAVENOUS ONCE
Status: CANCELLED | OUTPATIENT
Start: 2019-03-29

## 2019-03-29 RX ORDER — SODIUM CHLORIDE 9 MG/ML
INJECTION, SOLUTION INTRAVENOUS ONCE
Status: COMPLETED | OUTPATIENT
Start: 2019-03-29 | End: 2019-03-29

## 2019-03-29 RX ORDER — ACETAMINOPHEN 325 MG/1
650 TABLET ORAL
Status: DISCONTINUED | OUTPATIENT
Start: 2019-03-29 | End: 2019-03-30 | Stop reason: HOSPADM

## 2019-03-29 RX ORDER — SODIUM CHLORIDE 0.9 % (FLUSH) 0.9 %
5 SYRINGE (ML) INJECTION
Status: DISCONTINUED | OUTPATIENT
Start: 2019-03-29 | End: 2019-03-30 | Stop reason: HOSPADM

## 2019-03-29 RX ORDER — VENLAFAXINE HYDROCHLORIDE 150 MG/1
150 CAPSULE, EXTENDED RELEASE ORAL DAILY
Refills: 2 | COMMUNITY
Start: 2019-03-06

## 2019-03-29 RX ORDER — DIPHENHYDRAMINE HCL 25 MG
25 CAPSULE ORAL ONCE
Status: CANCELLED | OUTPATIENT
Start: 2019-03-29

## 2019-03-29 RX ORDER — PROPRANOLOL HYDROCHLORIDE 60 MG/1
60 TABLET ORAL
Refills: 2 | COMMUNITY
Start: 2019-03-06

## 2019-03-29 RX ORDER — SODIUM CHLORIDE 0.9 % (FLUSH) 0.9 %
5 SYRINGE (ML) INJECTION
Status: CANCELLED | OUTPATIENT
Start: 2019-03-29

## 2019-03-29 RX ORDER — DIPHENHYDRAMINE HCL 25 MG
25 CAPSULE ORAL ONCE
Status: COMPLETED | OUTPATIENT
Start: 2019-03-29 | End: 2019-03-29

## 2019-03-29 RX ORDER — ACETAMINOPHEN 325 MG/1
650 TABLET ORAL
Status: CANCELLED | OUTPATIENT
Start: 2019-03-29

## 2019-03-29 RX ADMIN — INFLIXIMAB 250 MG: 100 INJECTION, POWDER, LYOPHILIZED, FOR SOLUTION INTRAVENOUS at 02:03

## 2019-03-29 RX ADMIN — DIPHENHYDRAMINE HYDROCHLORIDE 25 MG: 25 CAPSULE ORAL at 02:03

## 2019-03-29 RX ADMIN — Medication 5 ML: at 02:03

## 2019-03-29 RX ADMIN — SODIUM CHLORIDE: 9 INJECTION, SOLUTION INTRAVENOUS at 04:03

## 2019-03-29 NOTE — PLAN OF CARE
Problem: Adult Inpatient Plan of Care  Goal: Plan of Care Review  Outcome: Ongoing (interventions implemented as appropriate)  Pt reports no issues since last infusion. Pt tolerated infusion well with no issues. Pt watched shows on her phone with earbuds during infusion today.

## 2019-03-29 NOTE — NURSING
Remicade infusion complete at this time. Pt tolerated infusion without difficulty. No S+S of adverse reactions noted. Vital signs stable, afebrile throughout infusion. IV to R AC discontinued. Catheter intact. Pressure dressing with gauze and coban applied to site. Pt tolerated procedure well. Pt instructed to call Monday with new insurance info, call with any issues or concerns prior to next infusion.Pt repeated back instructions and verbalized complete understanding.

## 2019-04-01 LAB
HBV CORE AB SERPL QL IA: NEGATIVE
HBV SURFACE AG SERPL QL IA: NEGATIVE

## 2019-06-04 ENCOUNTER — TELEPHONE (OUTPATIENT)
Dept: PEDIATRIC GASTROENTEROLOGY | Facility: CLINIC | Age: 24
End: 2019-06-04

## 2019-06-04 NOTE — TELEPHONE ENCOUNTER
Spoke with pre-service (Chastity).  States [employer] insurance will not allow any more infusions at [facility], will only approve if she transfers infusions to [facility].      Called patient, she now has [facility] as her primary insurance. Still follows with Dr. Hicks and has not transitioned to adult GI. Called Chastity to update, and she will submit request to [facility] next.

## 2019-06-07 ENCOUNTER — TELEPHONE (OUTPATIENT)
Dept: INFUSION THERAPY | Facility: HOSPITAL | Age: 24
End: 2019-06-07

## 2019-06-07 NOTE — TELEPHONE ENCOUNTER
Notified pt on insurance approval for one more Remicade infusion with us, then need to transition care to ambulatory center or home infusions.  Advised pt that she had an appt with dr baird on Wednesday and they could discuss transitioning care.  Advised pt to also contact her insurance to find an infusion center in network as well as a list of providers.  Pt verbalized understanding.

## 2019-06-11 ENCOUNTER — TELEPHONE (OUTPATIENT)
Dept: INFUSION THERAPY | Facility: HOSPITAL | Age: 24
End: 2019-06-11

## 2019-06-12 ENCOUNTER — OFFICE VISIT (OUTPATIENT)
Dept: PEDIATRIC GASTROENTEROLOGY | Facility: CLINIC | Age: 24
End: 2019-06-12
Payer: COMMERCIAL

## 2019-06-12 ENCOUNTER — HOSPITAL ENCOUNTER (OUTPATIENT)
Dept: INFUSION THERAPY | Facility: HOSPITAL | Age: 24
Discharge: HOME | End: 2019-06-12
Attending: PEDIATRICS
Payer: COMMERCIAL

## 2019-06-12 VITALS
HEART RATE: 71 BPM | TEMPERATURE: 98 F | HEIGHT: 62 IN | BODY MASS INDEX: 18.94 KG/M2 | WEIGHT: 102.94 LBS | SYSTOLIC BLOOD PRESSURE: 123 MMHG | DIASTOLIC BLOOD PRESSURE: 77 MMHG | RESPIRATION RATE: 18 BRPM

## 2019-06-12 DIAGNOSIS — R70.0 ELEVATED SEDIMENTATION RATE: ICD-10-CM

## 2019-06-12 DIAGNOSIS — K51.00 ULCERATIVE PANCOLITIS WITHOUT COMPLICATION: Primary | ICD-10-CM

## 2019-06-12 DIAGNOSIS — D84.9 IMMUNOSUPPRESSION: ICD-10-CM

## 2019-06-12 LAB
ALBUMIN SERPL BCP-MCNC: 4.2 G/DL (ref 3.5–5.2)
ALP SERPL-CCNC: 54 U/L (ref 55–135)
ALT SERPL W/O P-5'-P-CCNC: 9 U/L (ref 10–44)
ANION GAP SERPL CALC-SCNC: 11 MMOL/L (ref 8–16)
AST SERPL-CCNC: 17 U/L (ref 10–40)
BASOPHILS # BLD AUTO: 0.06 K/UL (ref 0–0.2)
BASOPHILS NFR BLD: 0.8 % (ref 0–1.9)
BILIRUB SERPL-MCNC: 0.6 MG/DL (ref 0.1–1)
BUN SERPL-MCNC: 7 MG/DL (ref 6–20)
CALCIUM SERPL-MCNC: 10.4 MG/DL (ref 8.7–10.5)
CHLORIDE SERPL-SCNC: 103 MMOL/L (ref 95–110)
CO2 SERPL-SCNC: 22 MMOL/L (ref 23–29)
CREAT SERPL-MCNC: 0.8 MG/DL (ref 0.5–1.4)
CRP SERPL-MCNC: 0.6 MG/L (ref 0–3.19)
DIFFERENTIAL METHOD: ABNORMAL
EOSINOPHIL # BLD AUTO: 0.1 K/UL (ref 0–0.5)
EOSINOPHIL NFR BLD: 1.5 % (ref 0–8)
ERYTHROCYTE [DISTWIDTH] IN BLOOD BY AUTOMATED COUNT: 13.2 % (ref 11.5–14.5)
ERYTHROCYTE [SEDIMENTATION RATE] IN BLOOD BY WESTERGREN METHOD: 6 MM/HR (ref 0–36)
EST. GFR  (AFRICAN AMERICAN): >60 ML/MIN/1.73 M^2
EST. GFR  (NON AFRICAN AMERICAN): >60 ML/MIN/1.73 M^2
GGT SERPL-CCNC: 24 U/L (ref 8–55)
GLUCOSE SERPL-MCNC: 81 MG/DL (ref 70–110)
HCT VFR BLD AUTO: 41.4 % (ref 37–48.5)
HGB BLD-MCNC: 13.1 G/DL (ref 12–16)
IMM GRANULOCYTES # BLD AUTO: 0.02 K/UL (ref 0–0.04)
IMM GRANULOCYTES NFR BLD AUTO: 0.3 % (ref 0–0.5)
LYMPHOCYTES # BLD AUTO: 2.9 K/UL (ref 1–4.8)
LYMPHOCYTES NFR BLD: 37.9 % (ref 18–48)
MCH RBC QN AUTO: 29.5 PG (ref 27–31)
MCHC RBC AUTO-ENTMCNC: 31.6 G/DL (ref 32–36)
MCV RBC AUTO: 93 FL (ref 82–98)
MONOCYTES # BLD AUTO: 0.6 K/UL (ref 0.3–1)
MONOCYTES NFR BLD: 7.1 % (ref 4–15)
NEUTROPHILS # BLD AUTO: 4.1 K/UL (ref 1.8–7.7)
NEUTROPHILS NFR BLD: 52.4 % (ref 38–73)
NRBC BLD-RTO: 0 /100 WBC
PLATELET # BLD AUTO: 413 K/UL (ref 150–350)
PMV BLD AUTO: 10.8 FL (ref 9.2–12.9)
POTASSIUM SERPL-SCNC: 4.2 MMOL/L (ref 3.5–5.1)
PROT SERPL-MCNC: 8.3 G/DL (ref 6–8.4)
RBC # BLD AUTO: 4.44 M/UL (ref 4–5.4)
SODIUM SERPL-SCNC: 136 MMOL/L (ref 136–145)
WBC # BLD AUTO: 7.76 K/UL (ref 3.9–12.7)

## 2019-06-12 PROCEDURE — 85652 RBC SED RATE AUTOMATED: CPT

## 2019-06-12 PROCEDURE — 99214 OFFICE O/P EST MOD 30 MIN: CPT | Mod: S$GLB,,, | Performed by: PEDIATRICS

## 2019-06-12 PROCEDURE — 80053 COMPREHEN METABOLIC PANEL: CPT

## 2019-06-12 PROCEDURE — 96413 CHEMO IV INFUSION 1 HR: CPT | Mod: PO

## 2019-06-12 PROCEDURE — 86141 C-REACTIVE PROTEIN HS: CPT

## 2019-06-12 PROCEDURE — 25000003 PHARM REV CODE 250: Mod: PO | Performed by: PEDIATRICS

## 2019-06-12 PROCEDURE — 82977 ASSAY OF GGT: CPT

## 2019-06-12 PROCEDURE — 99999 PR PBB SHADOW E&M-EST. PATIENT-LVL II: ICD-10-PCS | Mod: PBBFAC,,, | Performed by: PEDIATRICS

## 2019-06-12 PROCEDURE — 85025 COMPLETE CBC W/AUTO DIFF WBC: CPT

## 2019-06-12 PROCEDURE — 63600175 PHARM REV CODE 636 W HCPCS: Mod: JG,PO | Performed by: PEDIATRICS

## 2019-06-12 PROCEDURE — 96415 CHEMO IV INFUSION ADDL HR: CPT | Mod: PO

## 2019-06-12 PROCEDURE — 99999 PR PBB SHADOW E&M-EST. PATIENT-LVL II: CPT | Mod: PBBFAC,,, | Performed by: PEDIATRICS

## 2019-06-12 PROCEDURE — 99214 PR OFFICE/OUTPT VISIT, EST, LEVL IV, 30-39 MIN: ICD-10-PCS | Mod: S$GLB,,, | Performed by: PEDIATRICS

## 2019-06-12 PROCEDURE — A4216 STERILE WATER/SALINE, 10 ML: HCPCS | Mod: PO | Performed by: PEDIATRICS

## 2019-06-12 RX ORDER — SODIUM CHLORIDE 0.9 % (FLUSH) 0.9 %
5 SYRINGE (ML) INJECTION
Status: DISCONTINUED | OUTPATIENT
Start: 2019-06-12 | End: 2019-06-13 | Stop reason: HOSPADM

## 2019-06-12 RX ORDER — DIPHENHYDRAMINE HCL 25 MG
25 CAPSULE ORAL ONCE
Status: COMPLETED | OUTPATIENT
Start: 2019-06-12 | End: 2019-06-12

## 2019-06-12 RX ORDER — ACETAMINOPHEN 325 MG/1
650 TABLET ORAL
Status: DISCONTINUED | OUTPATIENT
Start: 2019-06-12 | End: 2019-06-13 | Stop reason: HOSPADM

## 2019-06-12 RX ORDER — SODIUM CHLORIDE 9 MG/ML
INJECTION, SOLUTION INTRAVENOUS ONCE
Status: COMPLETED | OUTPATIENT
Start: 2019-06-12 | End: 2019-06-12

## 2019-06-12 RX ORDER — SODIUM CHLORIDE 9 MG/ML
INJECTION, SOLUTION INTRAVENOUS ONCE
OUTPATIENT
Start: 2019-06-12

## 2019-06-12 RX ORDER — DIPHENHYDRAMINE HCL 25 MG
25 CAPSULE ORAL ONCE
OUTPATIENT
Start: 2019-06-12

## 2019-06-12 RX ORDER — ACETAMINOPHEN 325 MG/1
650 TABLET ORAL
OUTPATIENT
Start: 2019-06-12

## 2019-06-12 RX ORDER — SODIUM CHLORIDE 0.9 % (FLUSH) 0.9 %
5 SYRINGE (ML) INJECTION
OUTPATIENT
Start: 2019-06-12

## 2019-06-12 RX ADMIN — DIPHENHYDRAMINE HYDROCHLORIDE 25 MG: 25 CAPSULE ORAL at 02:06

## 2019-06-12 RX ADMIN — Medication 5 ML: at 02:06

## 2019-06-12 RX ADMIN — INFLIXIMAB 250 MG: 100 INJECTION, POWDER, LYOPHILIZED, FOR SOLUTION INTRAVENOUS at 02:06

## 2019-06-12 RX ADMIN — SODIUM CHLORIDE: 9 INJECTION, SOLUTION INTRAVENOUS at 04:06

## 2019-06-12 NOTE — PLAN OF CARE
Problem: Adult Inpatient Plan of Care  Goal: Plan of Care Review  Outcome: Ongoing (interventions implemented as appropriate)  Pt stated that she has been doing well. She has been keeping busy with work. No problems reported today. Pt tolerated Remicade infusion without difficulty. Pt watched TV during her infusion today.

## 2019-06-12 NOTE — PATIENT INSTRUCTIONS
Remicade today  Transition care  Preventative care reviewed with patient and family including need for annual flu shot as well as all age appropriate non-live vaccines.  Follow up 10 weeks for next infusion

## 2019-06-12 NOTE — NURSING
Remicade infusion complete @ this time.  Pt tolerated infusion without difficulty.  No S+S of adverse reactions noted.  Vital signs stable, afebrile throughout infusion.  IV to right forearm d/c'd.  Catheter intact.  Pressure dressing with gauze + coban applied to site.  Pt tolerated procedure well. Pt instructed to return to clinic in 10 weeks for next infusion, + to call clinic for any problems or concerns. Pt instructed to call insurance company to determine place to continue Remicade. Pt repeated back instructions, + verbalized complete understanding.

## 2019-06-12 NOTE — LETTER
June 12, 2019        Zainab Eagle MD  [address]  [place] [address]             [facility]  [address]  [facility] [address]  Phone: [phone]   Patient: Fatoumata Soriano   MR Number: [id number]   YOB: 1995   Date of Visit: 6/12/2019       Dear Dr. Eagle:    Thank you for referring Fatoumata Soriano to me for evaluation. Attached you will find relevant portions of my assessment and plan of care.    If you have questions, please do not hesitate to call me. I look forward to following Fatoumata Soriano along with you.    Sincerely,      Johnnie Hicks MD            CC  No Recipients    Enclosure

## 2019-06-13 NOTE — PROGRESS NOTES
Subjective:       Patient ID: Fatoumata Soriano is a 23 y.o. female.    Chief Complaint: No chief complaint on file.    HPI  Review of Systems   Constitutional: Negative for activity change, appetite change, fatigue, fever and unexpected weight change.   HENT: Negative for congestion, hearing loss, mouth sores, rhinorrhea, sneezing, sore throat and trouble swallowing.    Eyes: Negative for photophobia and visual disturbance.   Respiratory: Negative for apnea, cough, choking, chest tightness, shortness of breath, wheezing and stridor.    Cardiovascular: Negative for chest pain.   Gastrointestinal: Negative for blood in stool, constipation and diarrhea.   Endocrine: Negative for cold intolerance and heat intolerance.   Genitourinary: Negative for decreased urine volume, dysuria and menstrual problem.   Musculoskeletal: Negative for arthralgias, back pain, joint swelling, myalgias, neck pain and neck stiffness.   Skin: Negative for pallor and rash.   Allergic/Immunologic: Negative for food allergies.   Neurological: Negative for seizures, weakness and headaches.   Hematological: Negative for adenopathy. Does not bruise/bleed easily.   Psychiatric/Behavioral: Negative for agitation and sleep disturbance. The patient is not nervous/anxious and is not hyperactive.        Objective:      Physical Exam    Assessment:       1. Ulcerative pancolitis without complication    2. Immunosuppression        Plan:       CHIEF COMPLAINT: Patient is here for follow up of ulcerative colitis.    HISTORY OF PRESENT ILLNESS:  Patient follows up today for ongoing care above symptoms and for her Remicade infusion.  She reports no problems.  There is no pain diarrhea blood in the stool.  Bowel movements are normal in daily.  There are no fevers.  She has not scheduled her follow-up colonoscopy.  Patient inquired about different medications for her ulcerative colitis given the expense and her pain for her medical care now.  She inquired about  Humira and finding out from a benefit standpoint the cost of the medication.  There are no fevers.  There are no rashes.  Menstrual cycles are normal. Patient has tolerated the Remicade without difficulty. Patient is being pushed to have her infusion done at an infusion center or at home.  She reports that no home infusion places come to her location.  There is not a known infusion center to take on her care either.  She will contact the insurance company regarding this.    STUDIES REVIEWED:  Last colonoscopy in 2014 was normal grossly.  They reported some focal architectural distortion microscopically.  Terminal ileum was normal. Normal labs last infliximab level of 6 with no antibodies    MEDICATIONS/ALLERGIES: The patient's MedCard has been reviewed and/or reconciled.    PMH, SH, FH, all reviewed and no changes except as noted.    PHYSICAL EXAMINATION:   Remainder of vital signs unremarkable, please refer to vital signs sheet.  General: Alert, WN, WH, NAD  Chest: Clear to auscultation bilaterally.No increased work of breathing   Heart: Regular, rate and rhythm without murmur  Abdomen: Soft, non tender, non distended, no hepatosplenomegaly, no stool masses, no rebound or guarding.  Extremities: Symmetric, well perfused and no edema.  Patient was admitted to the infusion center and an IV was started. Patient was premedicated with tylenol and benadryl. Patient was then infused with 250 mg of Remicade per protocol. Patient tolerated the infusion well, the IV was removed, and patient was discharged.    IMPRESSION/PLAN:  Patient follows up today for ongoing care of above symptoms.  Clinically she is doing well. She is tolerating the Remicade without difficulty. I discussed hesitancy switching to another medication min the current medication is working.  There is no guarantee the return to Remicade would be effective if loss of response.  Her last level was 6 which is on the low side but adequate and no antibodies.  She  does need to have a follow-up colonoscopy for surveillance of mucosal healing as well as dysplasia.  She was diagnosed about 11 years ago.  I did discuss transition of care as well. I provided names of my colleagues here.  I discussed the risk benefits and alternatives of the procedure including sedation by anesthesia and risk of perforating or bruising the organs of the GI tract with the caretaker who verbalized understanding of the plan and risk associated and agreed to proceed. Consent will be obtained at time of endoscopy.  Patient needs health maintenance items listed below as well.     Needs colonoscopy-would defer to transitioning physician after establishing care.  Patient Instructions   Remicade today  Transition care  Preventative care reviewed with patient and family including need for annual flu shot as well as all age appropriate non-live vaccines.  Follow up 10 weeks for next infusion      This was discussed at length with parents who expressed understanding and agreement. Questions were answered.  This note has been dictated using voice recognition software.

## 2019-07-23 ENCOUNTER — LAB VISIT (OUTPATIENT)
Dept: LAB | Facility: HOSPITAL | Age: 24
End: 2019-07-23
Attending: INTERNAL MEDICINE
Payer: COMMERCIAL

## 2019-07-23 DIAGNOSIS — E06.3 THYROIDITIS, AUTOIMMUNE: ICD-10-CM

## 2019-07-23 DIAGNOSIS — M81.0 OSTEOPOROSIS: Primary | ICD-10-CM

## 2019-07-23 DIAGNOSIS — E28.2 POLYCYSTIC OVARIES: ICD-10-CM

## 2019-07-23 LAB
25(OH)D3+25(OH)D2 SERPL-MCNC: 23 NG/ML (ref 30–96)
ALBUMIN SERPL BCP-MCNC: 4.5 G/DL (ref 3.5–5.2)
ALP SERPL-CCNC: 52 U/L (ref 55–135)
ALT SERPL W/O P-5'-P-CCNC: 10 U/L (ref 10–44)
ANION GAP SERPL CALC-SCNC: 6 MMOL/L (ref 8–16)
AST SERPL-CCNC: 14 U/L (ref 10–40)
BILIRUB SERPL-MCNC: 0.5 MG/DL (ref 0.1–1)
BUN SERPL-MCNC: 12 MG/DL (ref 6–20)
CALCIUM SERPL-MCNC: 9.9 MG/DL (ref 8.7–10.5)
CHLORIDE SERPL-SCNC: 106 MMOL/L (ref 95–110)
CO2 SERPL-SCNC: 28 MMOL/L (ref 23–29)
CREAT SERPL-MCNC: 0.9 MG/DL (ref 0.5–1.4)
EST. GFR  (AFRICAN AMERICAN): >60 ML/MIN/1.73 M^2
EST. GFR  (NON AFRICAN AMERICAN): >60 ML/MIN/1.73 M^2
GLUCOSE SERPL-MCNC: 92 MG/DL (ref 70–110)
PHOSPHATE SERPL-MCNC: 3.7 MG/DL (ref 2.7–4.5)
POTASSIUM SERPL-SCNC: 4.4 MMOL/L (ref 3.5–5.1)
PROT SERPL-MCNC: 8.2 G/DL (ref 6–8.4)
SODIUM SERPL-SCNC: 140 MMOL/L (ref 136–145)
T4 FREE SERPL-MCNC: 0.98 NG/DL (ref 0.71–1.51)
TSH SERPL DL<=0.005 MIU/L-ACNC: 1.06 UIU/ML (ref 0.4–4)

## 2019-07-23 PROCEDURE — 36415 COLL VENOUS BLD VENIPUNCTURE: CPT

## 2019-07-23 PROCEDURE — 84100 ASSAY OF PHOSPHORUS: CPT

## 2019-07-23 PROCEDURE — 84439 ASSAY OF FREE THYROXINE: CPT

## 2019-07-23 PROCEDURE — 84443 ASSAY THYROID STIM HORMONE: CPT

## 2019-07-23 PROCEDURE — 80053 COMPREHEN METABOLIC PANEL: CPT

## 2019-07-23 PROCEDURE — 83970 ASSAY OF PARATHORMONE: CPT

## 2019-07-23 PROCEDURE — 82306 VITAMIN D 25 HYDROXY: CPT

## 2019-07-24 LAB — PTH-INTACT SERPL-MCNC: 41 PG/ML (ref 9–77)

## 2019-08-09 ENCOUNTER — PATIENT MESSAGE (OUTPATIENT)
Dept: ENDOSCOPY | Facility: HOSPITAL | Age: 24
End: 2019-08-09

## 2019-09-04 ENCOUNTER — LAB VISIT (OUTPATIENT)
Dept: LAB | Facility: HOSPITAL | Age: 24
End: 2019-09-04
Attending: INTERNAL MEDICINE
Payer: COMMERCIAL

## 2019-09-04 DIAGNOSIS — E06.3 CHRONIC LYMPHOCYTIC THYROIDITIS: ICD-10-CM

## 2019-09-04 DIAGNOSIS — E04.2 NONTOXIC MULTINODULAR GOITER: Primary | ICD-10-CM

## 2019-09-04 DIAGNOSIS — M81.0 SENILE OSTEOPOROSIS: ICD-10-CM

## 2019-09-04 LAB
T4 FREE SERPL-MCNC: 1.11 NG/DL (ref 0.71–1.51)
TSH SERPL DL<=0.005 MIU/L-ACNC: 1.52 UIU/ML (ref 0.4–4)

## 2019-09-04 PROCEDURE — 84439 ASSAY OF FREE THYROXINE: CPT

## 2019-09-04 PROCEDURE — 84443 ASSAY THYROID STIM HORMONE: CPT

## 2019-09-04 PROCEDURE — 36415 COLL VENOUS BLD VENIPUNCTURE: CPT

## 2020-09-17 NOTE — NURSING
Iv placed to right AC without difficulty.  Labs drawn as ordered, labeled @ bedside, + sent to lab. However, IV infiltrated when flushed with saline.  IV d/c'd.  Catheter intact.  Pressure dressing with gauze + coban applied to site. New IV placed to left hand without difficulty. IV flushed with normal saline + left in place for remicade to arrive from pharmacy.  Pt tolerated each procedure well.  Will continue to monitor pt closely.  
Remicade infusion complete @ this time.  Pt tolerated infusion without difficulty.  No S+S of adverse reactions noted.  Vital signs stable, afebrile throughout infusion.  IV to left hand d/c'd.  Catheter intact.  Pressure dressing with gauze + coban applied to site.  Pt tolerated procedure well.  Mom + pt instructed to return to clinic in 10 weeks for next infusion, + to call clinic for any problems or concerns. They repeated back instructions, + verbalized complete understanding.  
no

## 2022-03-27 NOTE — TELEPHONE ENCOUNTER
----- Message from Mark Avina sent at 8/29/2018  8:22 AM CDT -----  Contact: Candy westfall/Nati Montemayor   Needs Advice    Reason for call:      Communication Preference: [phone]   Additional Information: Candy needs to speak with nurse about pt's insurance information that was faxed over to their office. You can reach her or any representative at the number above.   
Huy om vm, returning her call.  
1

## 2024-02-22 NOTE — PROGRESS NOTES
Subjective:       Patient ID: Fatoumata Soriano is a 21 y.o. female.    Chief Complaint: Ulcerative Colitis and IV Medication    HPI  Review of Systems   Constitutional: Negative for activity change, appetite change, fatigue, fever and unexpected weight change.   HENT: Negative for congestion, hearing loss, mouth sores, rhinorrhea, sneezing, sore throat and trouble swallowing.    Eyes: Negative for photophobia and visual disturbance.   Respiratory: Negative for apnea, cough, choking, chest tightness, shortness of breath, wheezing and stridor.    Cardiovascular: Negative for chest pain.   Gastrointestinal: Negative for blood in stool, constipation and diarrhea.   Endocrine: Negative for cold intolerance and heat intolerance.   Genitourinary: Negative for decreased urine volume, dysuria and menstrual problem.   Musculoskeletal: Negative for arthralgias, back pain, joint swelling, myalgias, neck pain and neck stiffness.   Skin: Negative for pallor and rash.   Allergic/Immunologic: Negative for food allergies.   Neurological: Negative for seizures, weakness and headaches.   Hematological: Negative for adenopathy. Does not bruise/bleed easily.   Psychiatric/Behavioral: Negative for agitation and sleep disturbance. The patient is not nervous/anxious and is not hyperactive.        Objective:      Physical Exam    Assessment:       1. Ulcerative pancolitis without complication    2. Low bone density for age    3. Immunosuppression        Plan:       CHIEF COMPLAINT: Patient is here for follow up of ulcerative colitis and low bone density.    HISTORY OF PRESENT ILLNESS: Patient is 11-year-old female follows up today for ongoing care of her ulcerative colitis and for her Remicade infusion.  Clinically she is doing well.  There is no abdominal pain diarrhea.  There is no blood in the stool.  Bowel movements are once a day and normal.  There are no fevers.  There is no nausea or vomiting.  Her menstrual cycles are regular.  There  "is no trouble with her infusions.  She continues on every 10 weeks infusion at this time.  Patient will not be attending school until next spring.  She is currently working.  She generally gets her flu shots from the pharmacy every year.  Patient's energy level is good.    STUDIES REVIEWED: Labs at last infusion showed a sedimentation rate of 25, CRP of 3.46, normal CBC CMP and GGT    MEDICATIONS/ALLERGIES: The patient's MedCard has been reviewed and/or reconciled.    PMH, SH, FH, all reviewed and no changes except as noted.    PHYSICAL EXAMINATION:   /73 (Patient Position: Sitting)   Pulse 74   Temp 97.4 °F (36.3 °C)   Resp 20   Ht 5' 1.61" (1.565 m)   Wt 49.7 kg (109 lb 10.9 oz)   LMP 06/22/2017 (Exact Date)   BMI 20.32 kg/m²     General: Alert, WN, WH, NAD  Chest: Clear to auscultation bilaterally.No increased work of breathing   Heart: Regular, rate and rhythm without murmur  Abdomen: Soft, non tender, non distended, positive bowel sounds, no hepatosplenomegaly, no stool masses, no rebound or guarding.  Extremities: Symmetric, well perfused and no edema.    Patient was admitted to the infusion center and an IV was started. Patient was premedicated with tylenol and benadryl. Patient was then infused with 250 mg of Remicade per protocol. Patient tolerated the infusion well, the IV was removed, and patient was discharged.  IMPRESSION/PLAN: Patient follows up today for ongoing care of above symptoms and for her Remicade infusion.  Clinically she is doing well.  At her last infusion her inflammatory markers are mildly elevated.  I have sent a Remicade level today as her infusions are spaced out at 10 weeks.  She has been on this infusion schedule prior to seeing me.  It has been about 3 years since her last colonoscopy.  She is getting to the point where she will need them more regularly for dysplasia and cancer surveillance.  We had discussed this at her last infusion.  We need to schedule her for " colonoscopy at her convenience.  I have recommended Pneumovax vaccination given her immunosuppression.  Patient needs other health maintenance items as listed below.  We need to repeat DEXA scan soon secondary to previous low bone density.  She continues on calcium and vitamin D.  I will see her back in 10 weeks at her next infusion.    Patient Instructions   Remicade level today  Recommend Pneumovax  Yearly Flu shots  Yearly Eye exams  Yearly TB skin testing  Repeat Dexa scan  Colonoscopy to be scheduled  Follow up 10 weeks      This was discussed at length with parents who expressed understanding and agreement. Questions were answered.  This note has been dictated using voice recognition software.        No